# Patient Record
Sex: FEMALE | Race: WHITE | NOT HISPANIC OR LATINO | Employment: OTHER | URBAN - METROPOLITAN AREA
[De-identification: names, ages, dates, MRNs, and addresses within clinical notes are randomized per-mention and may not be internally consistent; named-entity substitution may affect disease eponyms.]

---

## 2023-03-24 LAB
ALANINE AMINOTRANSFERASE (SGPT) (U/L) IN SER/PLAS: 17 U/L (ref 7–45)
ALBUMIN (G/DL) IN SER/PLAS: 4.4 G/DL (ref 3.4–5)
ALKALINE PHOSPHATASE (U/L) IN SER/PLAS: 73 U/L (ref 33–136)
ANION GAP IN SER/PLAS: 15 MMOL/L (ref 10–20)
ASPARTATE AMINOTRANSFERASE (SGOT) (U/L) IN SER/PLAS: 20 U/L (ref 9–39)
BILIRUBIN TOTAL (MG/DL) IN SER/PLAS: 0.7 MG/DL (ref 0–1.2)
CALCIUM (MG/DL) IN SER/PLAS: 10 MG/DL (ref 8.6–10.3)
CARBON DIOXIDE, TOTAL (MMOL/L) IN SER/PLAS: 28 MMOL/L (ref 21–32)
CHLORIDE (MMOL/L) IN SER/PLAS: 103 MMOL/L (ref 98–107)
CHOLESTEROL (MG/DL) IN SER/PLAS: 183 MG/DL (ref 0–199)
CHOLESTEROL IN HDL (MG/DL) IN SER/PLAS: 62.1 MG/DL
CHOLESTEROL/HDL RATIO: 2.9
CREATININE (MG/DL) IN SER/PLAS: 1.01 MG/DL (ref 0.5–1.05)
ERYTHROCYTE DISTRIBUTION WIDTH (RATIO) BY AUTOMATED COUNT: 13.7 % (ref 11.5–14.5)
ERYTHROCYTE MEAN CORPUSCULAR HEMOGLOBIN CONCENTRATION (G/DL) BY AUTOMATED: 31.8 G/DL (ref 32–36)
ERYTHROCYTE MEAN CORPUSCULAR VOLUME (FL) BY AUTOMATED COUNT: 102 FL (ref 80–100)
ERYTHROCYTES (10*6/UL) IN BLOOD BY AUTOMATED COUNT: 4.3 X10E12/L (ref 4–5.2)
ESTIMATED AVERAGE GLUCOSE FOR HBA1C: 111 MG/DL
GFR FEMALE: 55 ML/MIN/1.73M2
GLUCOSE (MG/DL) IN SER/PLAS: 109 MG/DL (ref 74–99)
HEMATOCRIT (%) IN BLOOD BY AUTOMATED COUNT: 44 % (ref 36–46)
HEMOGLOBIN (G/DL) IN BLOOD: 14 G/DL (ref 12–16)
HEMOGLOBIN A1C/HEMOGLOBIN TOTAL IN BLOOD: 5.5 %
LDL: 99 MG/DL (ref 0–99)
LEUKOCYTES (10*3/UL) IN BLOOD BY AUTOMATED COUNT: 5.9 X10E9/L (ref 4.4–11.3)
PLATELETS (10*3/UL) IN BLOOD AUTOMATED COUNT: 185 X10E9/L (ref 150–450)
POTASSIUM (MMOL/L) IN SER/PLAS: 3.7 MMOL/L (ref 3.5–5.3)
PROTEIN TOTAL: 7.7 G/DL (ref 6.4–8.2)
SODIUM (MMOL/L) IN SER/PLAS: 142 MMOL/L (ref 136–145)
THYROTROPIN (MIU/L) IN SER/PLAS BY DETECTION LIMIT <= 0.05 MIU/L: 5.94 MIU/L (ref 0.44–3.98)
THYROXINE (T4) FREE (NG/DL) IN SER/PLAS: 0.73 NG/DL (ref 0.61–1.12)
TRIGLYCERIDE (MG/DL) IN SER/PLAS: 112 MG/DL (ref 0–149)
UREA NITROGEN (MG/DL) IN SER/PLAS: 18 MG/DL (ref 6–23)
VLDL: 22 MG/DL (ref 0–40)

## 2023-06-01 ENCOUNTER — TELEPHONE (OUTPATIENT)
Dept: PRIMARY CARE | Facility: CLINIC | Age: 83
End: 2023-06-01
Payer: MEDICARE

## 2023-06-01 NOTE — TELEPHONE ENCOUNTER
Pt is checking if she is due for a pneumonia injection again. She cannot recall when she last had one. Please advise as she is a Dr Rivera pt.

## 2023-08-06 DIAGNOSIS — I48.92 UNSPECIFIED ATRIAL FLUTTER (MULTI): ICD-10-CM

## 2023-08-06 RX ORDER — AMIODARONE HYDROCHLORIDE 100 MG/1
100 TABLET ORAL DAILY
Qty: 90 TABLET | Refills: 1 | Status: SHIPPED | OUTPATIENT
Start: 2023-08-06 | End: 2024-01-15

## 2023-09-08 ENCOUNTER — TELEPHONE (OUTPATIENT)
Dept: PRIMARY CARE | Facility: CLINIC | Age: 83
End: 2023-09-08
Payer: MEDICARE

## 2023-09-08 DIAGNOSIS — N39.0 URINARY TRACT INFECTION WITHOUT HEMATURIA, SITE UNSPECIFIED: Primary | ICD-10-CM

## 2023-09-08 RX ORDER — NITROFURANTOIN 25; 75 MG/1; MG/1
100 CAPSULE ORAL EVERY 12 HOURS SCHEDULED
COMMUNITY
End: 2023-09-08 | Stop reason: SDUPTHER

## 2023-09-08 RX ORDER — NITROFURANTOIN 25; 75 MG/1; MG/1
100 CAPSULE ORAL EVERY 12 HOURS SCHEDULED
Qty: 14 CAPSULE | Refills: 0 | Status: SHIPPED | OUTPATIENT
Start: 2023-09-08 | End: 2023-09-19

## 2023-09-19 DIAGNOSIS — N39.0 URINARY TRACT INFECTION WITHOUT HEMATURIA, SITE UNSPECIFIED: ICD-10-CM

## 2023-09-19 RX ORDER — NITROFURANTOIN 25; 75 MG/1; MG/1
100 CAPSULE ORAL EVERY 12 HOURS
Qty: 14 CAPSULE | Refills: 0 | Status: SHIPPED | OUTPATIENT
Start: 2023-09-19 | End: 2023-09-26

## 2023-10-30 DIAGNOSIS — E78.5 HYPERLIPIDEMIA, UNSPECIFIED HYPERLIPIDEMIA TYPE: Primary | ICD-10-CM

## 2023-10-30 DIAGNOSIS — I10 ESSENTIAL (PRIMARY) HYPERTENSION: ICD-10-CM

## 2023-10-31 RX ORDER — ATORVASTATIN CALCIUM 40 MG/1
40 TABLET, FILM COATED ORAL DAILY
Qty: 90 TABLET | Refills: 3 | Status: SHIPPED | OUTPATIENT
Start: 2023-10-31 | End: 2024-05-08 | Stop reason: SDUPTHER

## 2023-10-31 RX ORDER — AMLODIPINE BESYLATE 5 MG/1
5 TABLET ORAL DAILY
Qty: 90 TABLET | Refills: 3 | Status: SHIPPED | OUTPATIENT
Start: 2023-10-31

## 2023-11-21 DIAGNOSIS — F41.8 OTHER SPECIFIED ANXIETY DISORDERS: ICD-10-CM

## 2023-11-21 DIAGNOSIS — M79.606 PAIN IN LEG, UNSPECIFIED: ICD-10-CM

## 2023-11-21 RX ORDER — MELOXICAM 7.5 MG/1
TABLET ORAL
Qty: 90 TABLET | Refills: 2 | Status: SHIPPED | OUTPATIENT
Start: 2023-11-21

## 2023-11-21 RX ORDER — PAROXETINE HYDROCHLORIDE 20 MG/1
20 TABLET, FILM COATED ORAL DAILY
Qty: 90 TABLET | Refills: 3 | Status: SHIPPED | OUTPATIENT
Start: 2023-11-21

## 2023-12-14 PROBLEM — N18.31 CKD STAGE G3A/A1, GFR 45-59 AND ALBUMIN CREATININE RATIO <30 MG/G (MULTI): Status: ACTIVE | Noted: 2023-12-14

## 2023-12-14 PROBLEM — M85.80 OSTEOPENIA: Status: ACTIVE | Noted: 2023-12-14

## 2023-12-14 PROBLEM — I48.92 ATRIAL FLUTTER (MULTI): Status: ACTIVE | Noted: 2023-12-14

## 2023-12-14 PROBLEM — I77.9 BILATERAL CAROTID ARTERY DISEASE (CMS-HCC): Status: ACTIVE | Noted: 2023-12-14

## 2023-12-14 PROBLEM — F33.1 MAJOR DEPRESSIVE DISORDER, RECURRENT, MODERATE (MULTI): Status: ACTIVE | Noted: 2023-12-14

## 2023-12-14 PROBLEM — R59.0 MEDIASTINAL LYMPHADENOPATHY: Status: ACTIVE | Noted: 2023-12-14

## 2023-12-14 PROBLEM — M19.90 OSTEOARTHRITIS: Status: ACTIVE | Noted: 2023-12-14

## 2023-12-14 PROBLEM — E78.5 HYPERLIPIDEMIA: Status: ACTIVE | Noted: 2023-12-14

## 2023-12-14 PROBLEM — J43.9 PULMONARY EMPHYSEMA (MULTI): Status: ACTIVE | Noted: 2023-12-14

## 2023-12-14 PROBLEM — I65.23 BILATERAL CAROTID ARTERY STENOSIS: Status: ACTIVE | Noted: 2023-12-14

## 2023-12-14 PROBLEM — I25.10 CAD (CORONARY ARTERY DISEASE): Status: ACTIVE | Noted: 2023-12-14

## 2023-12-14 PROBLEM — I48.0 PAROXYSMAL ATRIAL FIBRILLATION (MULTI): Status: ACTIVE | Noted: 2023-12-14

## 2023-12-14 PROBLEM — F41.8 DEPRESSION WITH ANXIETY: Status: ACTIVE | Noted: 2023-12-14

## 2023-12-14 PROBLEM — R09.02 HYPOXIA: Status: ACTIVE | Noted: 2023-12-14

## 2023-12-14 PROBLEM — I10 BENIGN HYPERTENSION: Status: ACTIVE | Noted: 2023-12-14

## 2023-12-14 PROBLEM — D64.9 ANEMIA: Status: ACTIVE | Noted: 2023-12-14

## 2023-12-14 PROBLEM — K74.60 LIVER CIRRHOSIS (MULTI): Status: ACTIVE | Noted: 2023-12-14

## 2024-01-02 ENCOUNTER — OFFICE VISIT (OUTPATIENT)
Dept: PRIMARY CARE | Facility: CLINIC | Age: 84
End: 2024-01-02
Payer: MEDICARE

## 2024-01-02 VITALS
TEMPERATURE: 97 F | OXYGEN SATURATION: 98 % | SYSTOLIC BLOOD PRESSURE: 126 MMHG | HEART RATE: 55 BPM | DIASTOLIC BLOOD PRESSURE: 66 MMHG | HEIGHT: 65 IN | BODY MASS INDEX: 26.19 KG/M2 | WEIGHT: 157.2 LBS | RESPIRATION RATE: 14 BRPM

## 2024-01-02 DIAGNOSIS — Z23 NEED FOR VACCINATION: ICD-10-CM

## 2024-01-02 DIAGNOSIS — K74.60 CIRRHOSIS OF LIVER WITHOUT ASCITES, UNSPECIFIED HEPATIC CIRRHOSIS TYPE (MULTI): ICD-10-CM

## 2024-01-02 DIAGNOSIS — Z78.0 ASYMPTOMATIC MENOPAUSAL STATE: ICD-10-CM

## 2024-01-02 DIAGNOSIS — F33.1 MAJOR DEPRESSIVE DISORDER, RECURRENT, MODERATE (MULTI): ICD-10-CM

## 2024-01-02 DIAGNOSIS — M85.89 OSTEOPENIA OF MULTIPLE SITES: ICD-10-CM

## 2024-01-02 DIAGNOSIS — Z12.31 ENCOUNTER FOR SCREENING MAMMOGRAM FOR BREAST CANCER: ICD-10-CM

## 2024-01-02 DIAGNOSIS — I48.0 PAROXYSMAL ATRIAL FIBRILLATION (MULTI): ICD-10-CM

## 2024-01-02 DIAGNOSIS — J43.9 PULMONARY EMPHYSEMA, UNSPECIFIED EMPHYSEMA TYPE (MULTI): ICD-10-CM

## 2024-01-02 DIAGNOSIS — G83.10 PARESIS OF LOWER EXTREMITY (MULTI): ICD-10-CM

## 2024-01-02 DIAGNOSIS — I77.9 BILATERAL CAROTID ARTERY DISEASE, UNSPECIFIED TYPE (CMS-HCC): ICD-10-CM

## 2024-01-02 DIAGNOSIS — Z00.00 ROUTINE GENERAL MEDICAL EXAMINATION AT HEALTH CARE FACILITY: Primary | ICD-10-CM

## 2024-01-02 DIAGNOSIS — R73.9 HYPERGLYCEMIA: ICD-10-CM

## 2024-01-02 DIAGNOSIS — E78.2 MIXED HYPERLIPIDEMIA: ICD-10-CM

## 2024-01-02 DIAGNOSIS — N18.31 CKD STAGE G3A/A1, GFR 45-59 AND ALBUMIN CREATININE RATIO <30 MG/G (MULTI): ICD-10-CM

## 2024-01-02 PROBLEM — J18.1 LUNG CONSOLIDATION (CMS-HCC): Status: ACTIVE | Noted: 2024-01-02

## 2024-01-02 PROBLEM — M79.639 PAIN IN FOREARM: Status: ACTIVE | Noted: 2024-01-02

## 2024-01-02 PROBLEM — R22.42 LOCALIZED SWELLING OF LEFT LOWER LEG: Status: ACTIVE | Noted: 2024-01-02

## 2024-01-02 PROBLEM — R07.81 RIB PAIN: Status: ACTIVE | Noted: 2024-01-02

## 2024-01-02 PROBLEM — R00.0 SINUS TACHYCARDIA: Status: ACTIVE | Noted: 2024-01-02

## 2024-01-02 PROBLEM — R09.89 CAROTID BRUIT: Status: ACTIVE | Noted: 2024-01-02

## 2024-01-02 PROBLEM — R53.83 FATIGUE: Status: ACTIVE | Noted: 2024-01-02

## 2024-01-02 PROBLEM — R91.8 GROUND GLASS OPACITY PRESENT ON IMAGING OF LUNG: Status: ACTIVE | Noted: 2023-03-21

## 2024-01-02 PROBLEM — J92.9 PLEURAL THICKENING: Status: ACTIVE | Noted: 2024-01-02

## 2024-01-02 PROBLEM — R91.1 SOLITARY PULMONARY NODULE: Status: ACTIVE | Noted: 2023-03-21

## 2024-01-02 PROBLEM — R91.1 LUNG NODULE: Status: ACTIVE | Noted: 2024-01-02

## 2024-01-02 PROBLEM — N39.0 URINARY TRACT INFECTION: Status: ACTIVE | Noted: 2024-01-02

## 2024-01-02 PROBLEM — G56.30 RADIAL TUNNEL SYNDROME: Status: ACTIVE | Noted: 2024-01-02

## 2024-01-02 PROBLEM — R91.8 ABNORMAL FINDINGS ON DIAGNOSTIC IMAGING OF LUNG: Status: ACTIVE | Noted: 2024-01-02

## 2024-01-02 PROBLEM — D75.89 MACROCYTOSIS WITHOUT ANEMIA: Status: ACTIVE | Noted: 2024-01-02

## 2024-01-02 PROBLEM — A41.9 SEPSIS (MULTI): Status: ACTIVE | Noted: 2024-01-02

## 2024-01-02 PROBLEM — R39.9 SYMPTOMS INVOLVING URINARY SYSTEM: Status: ACTIVE | Noted: 2024-01-02

## 2024-01-02 PROBLEM — R00.2 PALPITATIONS: Status: ACTIVE | Noted: 2024-01-02

## 2024-01-02 PROBLEM — M25.569 KNEE PAIN: Status: ACTIVE | Noted: 2024-01-02

## 2024-01-02 PROBLEM — M79.89 SWELLING OF RIGHT LOWER EXTREMITY: Status: ACTIVE | Noted: 2024-01-02

## 2024-01-02 PROBLEM — K40.90 RIGHT INGUINAL HERNIA: Status: ACTIVE | Noted: 2024-01-02

## 2024-01-02 PROBLEM — M79.606 PAIN OF LOWER EXTREMITY: Status: ACTIVE | Noted: 2024-01-02

## 2024-01-02 PROBLEM — S20.219A CONTUSION OF RIB: Status: ACTIVE | Noted: 2024-01-02

## 2024-01-02 PROBLEM — R10.30 INGUINAL PAIN: Status: ACTIVE | Noted: 2024-01-02

## 2024-01-02 PROBLEM — J06.9 ACUTE UPPER RESPIRATORY INFECTION: Status: ACTIVE | Noted: 2024-01-02

## 2024-01-02 PROBLEM — M25.559 ARTHRALGIA OF HIP: Status: ACTIVE | Noted: 2024-01-02

## 2024-01-02 PROCEDURE — 1160F RVW MEDS BY RX/DR IN RCRD: CPT | Performed by: INTERNAL MEDICINE

## 2024-01-02 PROCEDURE — 1036F TOBACCO NON-USER: CPT | Performed by: INTERNAL MEDICINE

## 2024-01-02 PROCEDURE — 99214 OFFICE O/P EST MOD 30 MIN: CPT | Performed by: INTERNAL MEDICINE

## 2024-01-02 PROCEDURE — 1126F AMNT PAIN NOTED NONE PRSNT: CPT | Performed by: INTERNAL MEDICINE

## 2024-01-02 PROCEDURE — G0009 ADMIN PNEUMOCOCCAL VACCINE: HCPCS | Performed by: INTERNAL MEDICINE

## 2024-01-02 PROCEDURE — 99397 PER PM REEVAL EST PAT 65+ YR: CPT | Performed by: INTERNAL MEDICINE

## 2024-01-02 PROCEDURE — 3078F DIAST BP <80 MM HG: CPT | Performed by: INTERNAL MEDICINE

## 2024-01-02 PROCEDURE — 1159F MED LIST DOCD IN RCRD: CPT | Performed by: INTERNAL MEDICINE

## 2024-01-02 PROCEDURE — 90677 PCV20 VACCINE IM: CPT | Performed by: INTERNAL MEDICINE

## 2024-01-02 PROCEDURE — 1170F FXNL STATUS ASSESSED: CPT | Performed by: INTERNAL MEDICINE

## 2024-01-02 PROCEDURE — 3074F SYST BP LT 130 MM HG: CPT | Performed by: INTERNAL MEDICINE

## 2024-01-02 PROCEDURE — G0439 PPPS, SUBSEQ VISIT: HCPCS | Performed by: INTERNAL MEDICINE

## 2024-01-02 RX ORDER — CARVEDILOL 6.25 MG/1
1 TABLET ORAL
COMMUNITY

## 2024-01-02 ASSESSMENT — ENCOUNTER SYMPTOMS
DIZZINESS: 0
EYES NEGATIVE: 1
BACK PAIN: 0
APPETITE CHANGE: 0
MYALGIAS: 0
WHEEZING: 0
SINUS PAIN: 0
TROUBLE SWALLOWING: 0
DYSURIA: 0
SLEEP DISTURBANCE: 0
CONFUSION: 0
GASTROINTESTINAL NEGATIVE: 1
HEADACHES: 0
SINUS PRESSURE: 0
COUGH: 0
VOMITING: 0
CONSTITUTIONAL NEGATIVE: 1
FLANK PAIN: 0
LIGHT-HEADEDNESS: 0
MUSCULOSKELETAL NEGATIVE: 1
COLOR CHANGE: 0
POLYDIPSIA: 0
NECK STIFFNESS: 0
POLYPHAGIA: 0
BRUISES/BLEEDS EASILY: 0
EYE PAIN: 0
DIFFICULTY URINATING: 0
NUMBNESS: 0
SHORTNESS OF BREATH: 0
PALPITATIONS: 0
ADENOPATHY: 0
DEPRESSION: 0
LOSS OF SENSATION IN FEET: 0
PSYCHIATRIC NEGATIVE: 1
UNEXPECTED WEIGHT CHANGE: 0
ENDOCRINE NEGATIVE: 1
EYE DISCHARGE: 0
EYE REDNESS: 0
NERVOUS/ANXIOUS: 0
NECK PAIN: 0
NEUROLOGICAL NEGATIVE: 1
SPEECH DIFFICULTY: 0
HALLUCINATIONS: 0
SORE THROAT: 0
BLOOD IN STOOL: 0
SEIZURES: 0
HEMATOLOGIC/LYMPHATIC NEGATIVE: 1
NAUSEA: 0
ACTIVITY CHANGE: 0
STRIDOR: 0
JOINT SWELLING: 0
WEAKNESS: 0
AGITATION: 0
ALLERGIC/IMMUNOLOGIC NEGATIVE: 1
ARTHRALGIAS: 0
VOICE CHANGE: 0
RESPIRATORY NEGATIVE: 1
DIARRHEA: 0
FREQUENCY: 0
CHEST TIGHTNESS: 0
FACIAL ASYMMETRY: 0
ABDOMINAL PAIN: 0
WOUND: 0
TREMORS: 0
CONSTIPATION: 0
OCCASIONAL FEELINGS OF UNSTEADINESS: 0
CARDIOVASCULAR NEGATIVE: 1

## 2024-01-02 ASSESSMENT — PATIENT HEALTH QUESTIONNAIRE - PHQ9
1. LITTLE INTEREST OR PLEASURE IN DOING THINGS: NOT AT ALL
2. FEELING DOWN, DEPRESSED OR HOPELESS: NOT AT ALL
SUM OF ALL RESPONSES TO PHQ9 QUESTIONS 1 AND 2: 0

## 2024-01-02 ASSESSMENT — ACTIVITIES OF DAILY LIVING (ADL)
BATHING: INDEPENDENT
DOING_HOUSEWORK: INDEPENDENT
MANAGING_FINANCES: INDEPENDENT
GROCERY_SHOPPING: INDEPENDENT
DRESSING: INDEPENDENT
TAKING_MEDICATION: INDEPENDENT

## 2024-01-02 NOTE — PATIENT INSTRUCTIONS

## 2024-01-02 NOTE — PROGRESS NOTES
Subjective   Reason for Visit: Karen Patel is an 83 y.o. female here for a Medicare Wellness visit.     Past Medical, Surgical, and Family History reviewed and updated in chart.    Reviewed all medications by prescribing practitioner or clinical pharmacist (such as prescriptions, OTCs, herbal therapies and supplements) and documented in the medical record.    [facility]    Patient Care Team:  Giovanna Rowe MD PhD as PCP - General  Giovanna Rowe MD PhD as PCP - [employer] PCP     Patient comes for MW, CPE and f/up visit.   Patient has been feeling pretty good and has been complaint with prescribed medications.    We reviewed and discussed details of recent blood work: CBC, CMP, TSH, Lipid panel, Hb A1c, Vit D done in 3/2023. Results within acceptable range.    Last mammogram: 1/31/23  DEXA: 2022  PAP: n/a  Colonoscopy: n/a  Pneumonia vacc: today    Adv. Dir:  not on file    Her CT abdomen done in September 2021 showed irregular shape of the liver suspicious for cirrhosis. She saw GI  had Fiber scan done which showed diffuse steatosis.   Additionally 10 mm left lower lung nodule noted.   She saw pulmonology Dr. Tyson, had f/u CT and PET scan done. Advised closed monitoring.   Follows with  GI and pulmonary specialists.      Patient has been following with vascular surgeon regarding carotid stenosis.      Her DEXA scan in June 2021 showed osteopenia.      Due to concern about left leg pain and some swelling behind left knee she had venous duplex done in June 2020 which was negative.  Subsequently saw ortho Dr. Whyte , had X-ray , diagnosed with mild arthritis. Her symptoms improved/resolved.      Her brother was diagnosed with advanced bladder ca, previously had lung ca and recovered from it.    Patient follows with  cardiologist, previously diagnosed with atrial flutter, advised Amiodarone and Xarelto 15 mg daily.  I reviewed recent cardiology note (Dr. Simpson: Sept 2023).    Carotid duplex  showed bilateral carotid disease 50-69% stenosis with heterogenous plague.  Follows with vascular surgeon.     Currently on Xarelto as recommended by cardiologist. Denies any excessive bleeding or bruising.      Recent carotid duplex in 2022 showed moderate carotid stenosis, she will f/u with vascular surgeon.  Review of Systems   Constitutional: Negative.  Negative for activity change, appetite change and unexpected weight change.   HENT: Negative.  Negative for congestion, ear discharge, ear pain, hearing loss, mouth sores, nosebleeds, sinus pressure, sinus pain, sore throat, trouble swallowing and voice change.    Eyes: Negative.  Negative for pain, discharge, redness and visual disturbance.   Respiratory: Negative.  Negative for cough, chest tightness, shortness of breath, wheezing and stridor.    Cardiovascular: Negative.  Negative for chest pain, palpitations and leg swelling.   Gastrointestinal: Negative.  Negative for abdominal pain, blood in stool, constipation, diarrhea, nausea and vomiting.   Endocrine: Negative.  Negative for polydipsia, polyphagia and polyuria.   Genitourinary: Negative.  Negative for difficulty urinating, dysuria, flank pain, frequency and urgency.   Musculoskeletal: Negative.  Negative for arthralgias, back pain, gait problem, joint swelling, myalgias, neck pain and neck stiffness.   Skin: Negative.  Negative for color change, rash and wound.   Allergic/Immunologic: Negative.  Negative for environmental allergies, food allergies and immunocompromised state.   Neurological: Negative.  Negative for dizziness, tremors, seizures, syncope, facial asymmetry, speech difficulty, weakness, light-headedness, numbness and headaches.   Hematological: Negative.  Negative for adenopathy. Does not bruise/bleed easily.   Psychiatric/Behavioral: Negative.  Negative for agitation, behavioral problems, confusion, hallucinations, sleep disturbance and suicidal ideas. The patient is not nervous/anxious.   "  All other systems reviewed and are negative.      Objective   Vitals:  /66   Pulse 55   Temp 36.1 °C (97 °F)   Resp 14   Ht 1.651 m (5' 5\")   Wt 71.3 kg (157 lb 3.2 oz)   SpO2 98%   BMI 26.16 kg/m²       Physical Exam  Vitals and nursing note reviewed.   Constitutional:       General: She is not in acute distress.     Appearance: Normal appearance.   HENT:      Head: Normocephalic and atraumatic.      Right Ear: Tympanic membrane, ear canal and external ear normal.      Left Ear: Tympanic membrane, ear canal and external ear normal.      Nose: Nose normal. No congestion or rhinorrhea.      Mouth/Throat:      Mouth: Mucous membranes are moist.      Pharynx: Oropharynx is clear.   Eyes:      General:         Right eye: No discharge.         Left eye: No discharge.      Extraocular Movements: Extraocular movements intact.      Conjunctiva/sclera: Conjunctivae normal.      Pupils: Pupils are equal, round, and reactive to light.   Cardiovascular:      Rate and Rhythm: Normal rate and regular rhythm.      Pulses: Normal pulses.      Heart sounds: Normal heart sounds. No murmur heard.     No friction rub. No gallop.   Pulmonary:      Effort: Pulmonary effort is normal. No respiratory distress.      Breath sounds: Normal breath sounds. No stridor. No wheezing, rhonchi or rales.   Chest:      Chest wall: No tenderness.   Abdominal:      General: Bowel sounds are normal.      Palpations: Abdomen is soft. There is no mass.      Tenderness: There is no abdominal tenderness. There is no guarding or rebound.   Musculoskeletal:         General: No swelling or deformity. Normal range of motion.      Cervical back: Normal range of motion and neck supple. No rigidity or tenderness.      Right lower leg: No edema.      Left lower leg: No edema.   Lymphadenopathy:      Cervical: No cervical adenopathy.   Skin:     General: Skin is warm and dry.      Coloration: Skin is not jaundiced.      Findings: No bruising or " erythema.   Neurological:      General: No focal deficit present.      Mental Status: She is alert and oriented to person, place, and time. Mental status is at baseline.      Cranial Nerves: No cranial nerve deficit.      Motor: No weakness.      Coordination: Coordination normal.      Gait: Gait normal.   Psychiatric:         Mood and Affect: Mood normal.         Behavior: Behavior normal.         Thought Content: Thought content normal.         Judgment: Judgment normal.         Assessment/Plan   Problem List Items Addressed This Visit          Cardiac and Vasculature    Bilateral carotid artery disease (CMS/HCC)    Current Assessment & Plan     Clinically stable. F/up with cardiology.          Hyperlipidemia    Current Assessment & Plan     Continue statin.         Relevant Orders    Comprehensive Metabolic Panel    Lipid Panel    Paroxysmal atrial fibrillation (CMS/HCC)    Current Assessment & Plan     Clinically stable. F/up with cardiology.          Relevant Medications    carvedilol (Coreg) 6.25 mg tablet    Other Relevant Orders    CBC    TSH with reflex to Free T4 if abnormal       Endocrine/Metabolic    Hyperglycemia    Current Assessment & Plan     Low cholesterol and low carbohydrate diet is advised.          Relevant Orders    Hemoglobin A1C       Gastrointestinal and Abdominal    Liver cirrhosis (CMS/HCC)    Current Assessment & Plan     Clinically stable. Will monitor.             Genitourinary and Reproductive    CKD stage G3a/A1, GFR 45-59 and albumin creatinine ratio <30 mg/g (CMS/HCC)    Current Assessment & Plan     Clinically stable. Will monitor.            Health Encounters    Routine general medical examination at health care facility - Primary       Mental Health    Major depressive disorder, recurrent, moderate (CMS/HCC)    Current Assessment & Plan     Clinically stable. Continue current treatment.             Musculoskeletal and Injuries    Osteopenia    Relevant Orders    Vitamin D  25-Hydroxy,Total (for eval of Vitamin D levels)       Neuro    Paresis of lower extremity (CMS/HCC)    Current Assessment & Plan     Clinically stable. Continue exercise routine.             Pulmonary and Pneumonias    Pulmonary emphysema (CMS/HCC)    Current Assessment & Plan     Clinically stable. Will monitor.            Other Visit Diagnoses       Need for vaccination        Relevant Orders    Pneumococcal conjugate vaccine 20-valent IM (Completed)    Asymptomatic menopausal state        Relevant Orders    XR DEXA bone density    Encounter for screening mammogram for breast cancer        Relevant Orders    BI mammo bilateral screening tomosynthesis        It was a pleasure to see you today.  I would like to remind you about importance of a healthy lifestyle in order to improve your well-being and live longer.  Try to engage in physical activities for at least 150 minutes per week.  Eat about 10 servings of fruits and vegetables daily. My advice is 2 servings of fruits and 8 servings of vegetables.  For vegetables choose at least half of them green and at least half of them fresh.  Please avoid sugar, salt, fried food and saturated fat.    I spent a total of 30 minutes on the date of service for follow up visit, which included preparing to see the patient, face-to-face patient care, completing clinical documentation, obtaining and/or reviewing separately obtained history, performing a medically appropriate examination, counseling and educating the patient/family/caregiver, ordering medications, tests, or procedures, communicating with other health care providers (not separately reported), independently interpreting results (not separately reported), communicating results to the patient/family/caregiver, and care coordination (not separately reported).    Please bring copy of your Healthcare Living Will and POA for next visit as discussed.    F/up in 6 months or sooner if needed.     Patient was identified as a  fall risk. Risk prevention instructions provided.

## 2024-01-08 ENCOUNTER — APPOINTMENT (OUTPATIENT)
Dept: PRIMARY CARE | Facility: CLINIC | Age: 84
End: 2024-01-08
Payer: MEDICARE

## 2024-01-11 ENCOUNTER — APPOINTMENT (OUTPATIENT)
Dept: CARDIOLOGY | Facility: CLINIC | Age: 84
End: 2024-01-11
Payer: MEDICARE

## 2024-01-15 DIAGNOSIS — I48.92 UNSPECIFIED ATRIAL FLUTTER (MULTI): ICD-10-CM

## 2024-01-15 RX ORDER — AMIODARONE HYDROCHLORIDE 100 MG/1
100 TABLET ORAL DAILY
Qty: 90 TABLET | Refills: 1 | Status: SHIPPED | OUTPATIENT
Start: 2024-01-15

## 2024-02-01 ENCOUNTER — LAB (OUTPATIENT)
Dept: LAB | Facility: LAB | Age: 84
End: 2024-02-01
Payer: MEDICARE

## 2024-02-01 DIAGNOSIS — I48.0 PAROXYSMAL ATRIAL FIBRILLATION (MULTI): ICD-10-CM

## 2024-02-01 DIAGNOSIS — M85.89 OSTEOPENIA OF MULTIPLE SITES: ICD-10-CM

## 2024-02-01 DIAGNOSIS — R73.9 HYPERGLYCEMIA: ICD-10-CM

## 2024-02-01 DIAGNOSIS — E78.2 MIXED HYPERLIPIDEMIA: ICD-10-CM

## 2024-02-01 LAB
25(OH)D3 SERPL-MCNC: 43 NG/ML (ref 30–100)
ALBUMIN SERPL BCP-MCNC: 4.2 G/DL (ref 3.4–5)
ALP SERPL-CCNC: 58 U/L (ref 33–136)
ALT SERPL W P-5'-P-CCNC: 13 U/L (ref 7–45)
ANION GAP SERPL CALC-SCNC: 12 MMOL/L (ref 10–20)
AST SERPL W P-5'-P-CCNC: 15 U/L (ref 9–39)
BILIRUB SERPL-MCNC: 0.7 MG/DL (ref 0–1.2)
BUN SERPL-MCNC: 20 MG/DL (ref 6–23)
CALCIUM SERPL-MCNC: 9.4 MG/DL (ref 8.6–10.3)
CHLORIDE SERPL-SCNC: 105 MMOL/L (ref 98–107)
CHOLEST SERPL-MCNC: 157 MG/DL (ref 0–199)
CHOLESTEROL/HDL RATIO: 2.8
CO2 SERPL-SCNC: 29 MMOL/L (ref 21–32)
CREAT SERPL-MCNC: 1.06 MG/DL (ref 0.5–1.05)
EGFRCR SERPLBLD CKD-EPI 2021: 52 ML/MIN/1.73M*2
ERYTHROCYTE [DISTWIDTH] IN BLOOD BY AUTOMATED COUNT: 13.7 % (ref 11.5–14.5)
EST. AVERAGE GLUCOSE BLD GHB EST-MCNC: 108 MG/DL
GLUCOSE SERPL-MCNC: 93 MG/DL (ref 74–99)
HBA1C MFR BLD: 5.4 %
HCT VFR BLD AUTO: 37.7 % (ref 36–46)
HDLC SERPL-MCNC: 56.7 MG/DL
HGB BLD-MCNC: 12.1 G/DL (ref 12–16)
LDLC SERPL CALC-MCNC: 80 MG/DL
MCH RBC QN AUTO: 33 PG (ref 26–34)
MCHC RBC AUTO-ENTMCNC: 32.1 G/DL (ref 32–36)
MCV RBC AUTO: 103 FL (ref 80–100)
NON HDL CHOLESTEROL: 100 MG/DL (ref 0–149)
NRBC BLD-RTO: 0 /100 WBCS (ref 0–0)
PLATELET # BLD AUTO: 168 X10*3/UL (ref 150–450)
POTASSIUM SERPL-SCNC: 4.3 MMOL/L (ref 3.5–5.3)
PROT SERPL-MCNC: 6.9 G/DL (ref 6.4–8.2)
RBC # BLD AUTO: 3.67 X10*6/UL (ref 4–5.2)
SODIUM SERPL-SCNC: 142 MMOL/L (ref 136–145)
T4 FREE SERPL-MCNC: 0.79 NG/DL (ref 0.61–1.12)
TRIGL SERPL-MCNC: 100 MG/DL (ref 0–149)
TSH SERPL-ACNC: 5.68 MIU/L (ref 0.44–3.98)
VLDL: 20 MG/DL (ref 0–40)
WBC # BLD AUTO: 4.8 X10*3/UL (ref 4.4–11.3)

## 2024-02-01 PROCEDURE — 83036 HEMOGLOBIN GLYCOSYLATED A1C: CPT

## 2024-02-01 PROCEDURE — 36415 COLL VENOUS BLD VENIPUNCTURE: CPT

## 2024-02-01 PROCEDURE — 84443 ASSAY THYROID STIM HORMONE: CPT

## 2024-02-01 PROCEDURE — 80053 COMPREHEN METABOLIC PANEL: CPT

## 2024-02-01 PROCEDURE — 80061 LIPID PANEL: CPT

## 2024-02-01 PROCEDURE — 84439 ASSAY OF FREE THYROXINE: CPT

## 2024-02-01 PROCEDURE — 82306 VITAMIN D 25 HYDROXY: CPT

## 2024-02-01 PROCEDURE — 85027 COMPLETE CBC AUTOMATED: CPT

## 2024-04-11 ENCOUNTER — HOSPITAL ENCOUNTER (OUTPATIENT)
Dept: RADIOLOGY | Facility: CLINIC | Age: 84
Discharge: HOME | End: 2024-04-11
Payer: MEDICARE

## 2024-04-11 VITALS — HEIGHT: 65 IN | WEIGHT: 157.19 LBS | BODY MASS INDEX: 26.19 KG/M2

## 2024-04-11 DIAGNOSIS — Z12.31 ENCOUNTER FOR SCREENING MAMMOGRAM FOR BREAST CANCER: ICD-10-CM

## 2024-04-11 PROCEDURE — 77067 SCR MAMMO BI INCL CAD: CPT

## 2024-04-11 PROCEDURE — 77063 BREAST TOMOSYNTHESIS BI: CPT | Performed by: RADIOLOGY

## 2024-04-11 PROCEDURE — 77067 SCR MAMMO BI INCL CAD: CPT | Performed by: RADIOLOGY

## 2024-05-08 ENCOUNTER — OFFICE VISIT (OUTPATIENT)
Dept: CARDIOLOGY | Facility: CLINIC | Age: 84
End: 2024-05-08
Payer: MEDICARE

## 2024-05-08 VITALS
WEIGHT: 156.6 LBS | BODY MASS INDEX: 25.17 KG/M2 | SYSTOLIC BLOOD PRESSURE: 120 MMHG | OXYGEN SATURATION: 95 % | HEART RATE: 61 BPM | DIASTOLIC BLOOD PRESSURE: 56 MMHG | HEIGHT: 66 IN

## 2024-05-08 DIAGNOSIS — R01.1 MURMUR, HEART: ICD-10-CM

## 2024-05-08 DIAGNOSIS — E78.5 HYPERLIPIDEMIA, UNSPECIFIED HYPERLIPIDEMIA TYPE: ICD-10-CM

## 2024-05-08 DIAGNOSIS — I48.92 ATRIAL FLUTTER, UNSPECIFIED TYPE (MULTI): Primary | ICD-10-CM

## 2024-05-08 DIAGNOSIS — R09.89 BRUIT OF RIGHT CAROTID ARTERY: ICD-10-CM

## 2024-05-08 DIAGNOSIS — I10 BENIGN HYPERTENSION: ICD-10-CM

## 2024-05-08 DIAGNOSIS — I25.10 CORONARY ARTERY DISEASE INVOLVING NATIVE CORONARY ARTERY OF NATIVE HEART WITHOUT ANGINA PECTORIS: ICD-10-CM

## 2024-05-08 DIAGNOSIS — I34.0 MITRAL VALVE INSUFFICIENCY, UNSPECIFIED ETIOLOGY: ICD-10-CM

## 2024-05-08 PROCEDURE — 99214 OFFICE O/P EST MOD 30 MIN: CPT | Performed by: NURSE PRACTITIONER

## 2024-05-08 PROCEDURE — 3078F DIAST BP <80 MM HG: CPT | Performed by: NURSE PRACTITIONER

## 2024-05-08 PROCEDURE — 1157F ADVNC CARE PLAN IN RCRD: CPT | Performed by: NURSE PRACTITIONER

## 2024-05-08 PROCEDURE — 1036F TOBACCO NON-USER: CPT | Performed by: NURSE PRACTITIONER

## 2024-05-08 PROCEDURE — 1159F MED LIST DOCD IN RCRD: CPT | Performed by: NURSE PRACTITIONER

## 2024-05-08 PROCEDURE — 1160F RVW MEDS BY RX/DR IN RCRD: CPT | Performed by: NURSE PRACTITIONER

## 2024-05-08 PROCEDURE — 3074F SYST BP LT 130 MM HG: CPT | Performed by: NURSE PRACTITIONER

## 2024-05-08 RX ORDER — ATORVASTATIN CALCIUM 40 MG/1
80 TABLET, FILM COATED ORAL DAILY
Start: 2024-05-08

## 2024-05-08 NOTE — PATIENT INSTRUCTIONS
- recommend [facility] to monitor your atrial fibrillation  - referring you to Dr. Earl, Electrophysiology, to discuss if Amiodarone is best long term option for you.  - increase your Atorvastatin to 80mg once a day (2 tablets)  - Blood work in 2 weeks    Follow up with Dr. Simpson in 6 months    It was my pleasure to meet you. I look forward to being your cardiac Nurse Practitioner. I am a huge believer in communicating with my patients. Please contact me at any time, if anything is not clear to you regarding anything we have discussed, or if new questions occur to you.

## 2024-05-08 NOTE — PROGRESS NOTES
Name : Karen Patel   : 1940   MRN : [id number]   ENC Date : 2024    Primary Cardiologist: Dr. Simpson     CC: 6 month follow up     HPI:    Karen Patel is a 83 y.o. female with PMHx sig for CAD s/p PCI of RCA (17), dyslipidemia, bilateral carotid artery disease, and paroxysmal atrial fibrillation who presents today as above.    Used to be a walker. When covid started she quit. If she walks so far the top of her legs hurt like a tooth ache. Tylenol helps.    Palpitations have been ok. Her Apple Watch has told her that she was in afib 2x in the past 6 month, but she's completely asymptomatic when it happens    [facility]:  Echocardiogram 2022: EF 60 to 65%. Grade 1 diastolic dysfunction. Moderate left atrial enlargement. Moderate MR. Mild AR.     Carotid duplex 2022 with less than 50% stenosis bilaterally.     14 day ZioPatch 20 demonstrating 59% atrial fibrillation/flutter burden with average heart rate 86 bpm and range 53â€“166 bpm. Otherwise sinus rhythm. 10 runs of nonsustained ventricular tachycardia ranging between 4 and 9 beats. 16 runs of supraventricular tachycardia lasting less than 10 beats.     Carotid duplex 2020 demonstrating 50-69% stenosis of the right proximal ICA. 50-69% stenosis of the left proximal ICA.     Catheterization 17 performed for abnormal stress test demonstrating severe RCA stenosis. Underwent PCI of the RCA with overlapping 2.5 x 26 mm and 2.75 x 18 mm drug-eluting stents in the mid vessel. 2.75 x 8 mm Resolute MARILIN in the ostium in a nonoverlapping fashion. LAD 30% proximal stenosis. Ramus intermedius branch angiographically normal. Nondominant circumflex angiographically normal. LVEDP 5 mmHg. EF 60%. No AS and no MR.      Echocardiogram 3/3/16 demonstrating normal LV size and function EF 60-65%. Mild RV enlargement with normal function. Mild right atrial enlargement. Mild aortic stenosis. Mild to moderate MR. Mild TR and MO.     ROS:  unless otherwise noted in the history of present illness, all other systems were reviewed and they are negative for complaints     Allergies:  Sulfa (sulfonamide antibiotics)    Current Outpatient Medications   Medication Instructions    amiodarone (PACERONE) 100 mg, oral, Daily    amLODIPine (NORVASC) 5 mg, oral, Daily    atorvastatin (LIPITOR) 80 mg, oral, Daily    carvedilol (Coreg) 6.25 mg tablet 1 tablet, oral, 2 times daily with meals    meloxicam (Mobic) 7.5 mg tablet TAKE 1 TABLET DAILY AS NEEDED FOR ARTHRITIC PAIN    PARoxetine (PAXIL) 20 mg, oral, Daily    rivaroxaban (XARELTO) 15 mg, oral, 2 times daily with meals, Take with food.        Last Labs:  CBC  Lab Results   Component Value Date    WBC 4.8 02/01/2024    HGB 12.1 02/01/2024    HCT 37.7 02/01/2024     (H) 02/01/2024     02/01/2024       CMP  Lab Results   Component Value Date    CALCIUM 9.4 02/01/2024    PROT 6.9 02/01/2024    ALBUMIN 4.2 02/01/2024    AST 15 02/01/2024    ALT 13 02/01/2024    ALKPHOS 58 02/01/2024    BILITOT 0.7 02/01/2024       BMP   Lab Results   Component Value Date     02/01/2024    K 4.3 02/01/2024     02/01/2024    CO2 29 02/01/2024    GLUCOSE 93 02/01/2024    BUN 20 02/01/2024    CREATININE 1.06 (H) 02/01/2024       LIPID PANEL   Lab Results   Component Value Date    CHOL 157 02/01/2024    TRIG 100 02/01/2024    HDL 56.7 02/01/2024    CHHDL 2.8 02/01/2024    LDLF 99 03/24/2023    VLDL 20 02/01/2024    NHDL 100 02/01/2024       RENAL FUNCTION PANEL   Lab Results   Component Value Date    GLUCOSE 93 02/01/2024     02/01/2024    K 4.3 02/01/2024     02/01/2024    CO2 29 02/01/2024    ANIONGAP 12 02/01/2024    BUN 20 02/01/2024    CREATININE 1.06 (H) 02/01/2024    CALCIUM 9.4 02/01/2024    ALBUMIN 4.2 02/01/2024        Lab Results   Component Value Date    HGBA1C 5.4 02/01/2024     I have reviewed the above labs & diagnostics    Last Recorded Vitals:  Vitals:    05/08/24 1601   BP: 120/56  "  BP Location: Left arm   Patient Position: Sitting   Pulse: 61   SpO2: 95%   Weight: 71 kg (156 lb 9.6 oz)   Height: 1.676 m (5' 6\")     Physical Exam:  On exam Ms. Karen Patel appears her stated age, is alert and oriented x3, and in no acute distress. Her sclera are anicteric and her oropharynx has moist mucous membranes. Her neck is supple and without thyromegaly. + right carotid bruit. The JVP is ~5 cm of water above the right atrium. Her cardiac exam has regular rhythm, normal S1, S2. No S3/4. 1/6 systolic murmur LLSB. Her lungs are clear to auscultation bilaterally and there is no dullness to percussion. Her abdomen is soft, nontender with normoactive bowel sounds. There is no HJR. The extremities are warm and without edema. The skin is dry. There is no rash present. The distal pulses are 2-3+ in all four extremities. Her mood and affect are appropriate for todays encounter.     Assessment/Plan:  1) CAD: Stable from a symptom standpoint. Continue to observe. Last catheterization 2017. No symptoms concerning for ischemia since then. Based on the ISCHEMIA Trial no need for routine stress test  - c/w ASA, statin & BB.     2) dyslipidemia: LDL above goal. Increasing atorvastatin vs adding Zetia has been discussed on previous visits with Dr. Simpson. Jacqueline is willing to trial an increase in her Atorvastatin to 80mg at bedtime. LFTs in 2 weeks     3) hypertension: Well controlled on current regimen.     4) atrial fibrillation / flutter: No symptomatic palpitations. Does occasionally get notifications of elevated heart rate/atrial fibrillation by her watch.   - Dr. Simpson wanted her to see EP in the past to discuss best antiarrhythmic & if she should continue amio. She didn't make it to that EP appointment. Refer her to Dr. Earl for further evaluation.  - recommend noodls mobile device as apple watch picks up PACs as afib.  - for now, c/w amiodarone 100mg every day  - c/w Coreg 6.25mg BID  - c/w Xarelto 15mg every " day      5) bilateral carotid stenosis. Notable bruit right carotid. Follow with vascular surgery    6) Murmur. Echo in 2022 documented moderate MR. She remains asymptomatic. Would repeat echo ~2025 if she remains asymptomatic    Follow up with Dr. Simpson in 6 months. At which time a lipid panel should be repeated  Tracy M Schwab, APRN-CNP

## 2024-05-17 ENCOUNTER — LAB (OUTPATIENT)
Dept: LAB | Facility: LAB | Age: 84
End: 2024-05-17
Payer: MEDICARE

## 2024-05-17 DIAGNOSIS — E78.5 HYPERLIPIDEMIA, UNSPECIFIED HYPERLIPIDEMIA TYPE: ICD-10-CM

## 2024-05-17 LAB
ALBUMIN SERPL BCP-MCNC: 4 G/DL (ref 3.4–5)
ALP SERPL-CCNC: 58 U/L (ref 33–136)
ALT SERPL W P-5'-P-CCNC: 16 U/L (ref 7–45)
AST SERPL W P-5'-P-CCNC: 16 U/L (ref 9–39)
BILIRUB DIRECT SERPL-MCNC: 0.1 MG/DL (ref 0–0.3)
BILIRUB SERPL-MCNC: 0.4 MG/DL (ref 0–1.2)
PROT SERPL-MCNC: 6.7 G/DL (ref 6.4–8.2)

## 2024-05-17 PROCEDURE — 80076 HEPATIC FUNCTION PANEL: CPT

## 2024-05-17 PROCEDURE — 36415 COLL VENOUS BLD VENIPUNCTURE: CPT

## 2024-06-20 ENCOUNTER — TELEPHONE (OUTPATIENT)
Dept: CARDIOLOGY | Facility: CLINIC | Age: 84
End: 2024-06-20
Payer: MEDICARE

## 2024-06-20 DIAGNOSIS — E78.5 HYPERLIPIDEMIA, UNSPECIFIED HYPERLIPIDEMIA TYPE: ICD-10-CM

## 2024-06-20 RX ORDER — ATORVASTATIN CALCIUM 80 MG/1
80 TABLET, FILM COATED ORAL DAILY
Qty: 90 TABLET | Refills: 3 | Status: SHIPPED | OUTPATIENT
Start: 2024-06-20

## 2024-06-20 NOTE — TELEPHONE ENCOUNTER
Alananh changed Jacqueline's atorvastatin dose to 80 mg daily.  Please send new rx in for 80 mg tabs #90 x 3 to [facility].  Please adjust medication list to reflect correct dose    Thank you

## 2024-06-25 DIAGNOSIS — E78.5 HYPERLIPIDEMIA, UNSPECIFIED HYPERLIPIDEMIA TYPE: Primary | ICD-10-CM

## 2024-06-27 ENCOUNTER — APPOINTMENT (OUTPATIENT)
Dept: CARDIOLOGY | Facility: CLINIC | Age: 84
End: 2024-06-27
Payer: MEDICARE

## 2024-06-27 VITALS
DIASTOLIC BLOOD PRESSURE: 68 MMHG | WEIGHT: 156 LBS | SYSTOLIC BLOOD PRESSURE: 146 MMHG | BODY MASS INDEX: 25.07 KG/M2 | HEIGHT: 66 IN | OXYGEN SATURATION: 95 % | HEART RATE: 56 BPM

## 2024-06-27 DIAGNOSIS — I65.23 BILATERAL CAROTID ARTERY STENOSIS: ICD-10-CM

## 2024-06-27 DIAGNOSIS — R00.2 PALPITATIONS: ICD-10-CM

## 2024-06-27 DIAGNOSIS — I48.92 ATRIAL FLUTTER, UNSPECIFIED TYPE (MULTI): ICD-10-CM

## 2024-06-27 DIAGNOSIS — I10 BENIGN HYPERTENSION: ICD-10-CM

## 2024-06-27 DIAGNOSIS — E78.5 HYPERLIPIDEMIA, UNSPECIFIED HYPERLIPIDEMIA TYPE: ICD-10-CM

## 2024-06-27 DIAGNOSIS — I48.19 PERSISTENT ATRIAL FIBRILLATION (MULTI): Primary | ICD-10-CM

## 2024-06-27 PROBLEM — I77.9 BILATERAL CAROTID ARTERY DISEASE (CMS-HCC): Status: RESOLVED | Noted: 2023-12-14 | Resolved: 2024-06-27

## 2024-06-27 NOTE — PROGRESS NOTES
"    Cardiac Electrophysiology Office Visit     Referred by Dr. Simpson, Alvarez YORK MD for   Chief Complaint   Patient presents with    Atrial Fibrillation    Establish Care     HPI:  Karen Patel is a 83 y.o. year old female patient with h/o CAD s/p PCI (RCA 2017), HLD, coronary artery disease (bilateral), AF presenting today to establish care    Objective  Current Outpatient Medications   Medication Instructions    amiodarone (PACERONE) 100 mg, oral, Daily    amLODIPine (NORVASC) 5 mg, oral, Daily    atorvastatin (LIPITOR) 80 mg, oral, Daily    carvedilol (Coreg) 6.25 mg tablet 1 tablet, oral, 2 times daily (morning and late afternoon)    meloxicam (Mobic) 7.5 mg tablet TAKE 1 TABLET DAILY AS NEEDED FOR ARTHRITIC PAIN    PARoxetine (PAXIL) 20 mg, oral, Daily    rivaroxaban (XARELTO) 15 mg, oral, 2 times daily (morning and late afternoon), Take with food.       Visit Vitals  /68 (BP Location: Right arm, Patient Position: Sitting)   Pulse 56   Ht 1.676 m (5' 6\")   Wt 70.8 kg (156 lb)   SpO2 95%   BMI 25.18 kg/m²   OB Status Postmenopausal   Smoking Status Former   BSA 1.82 m²      Physical Exam  Constitutional:       Appearance: Normal appearance.   HENT:      Head: Normocephalic.   Cardiovascular:      Rate and Rhythm: Normal rate. Rhythm irregular.      Pulses: Normal pulses.   Pulmonary:      Effort: No respiratory distress.      Breath sounds: No wheezing.   Skin:     General: Skin is warm and dry.      Capillary Refill: Capillary refill takes less than 2 seconds.   Neurological:      Mental Status: She is alert.   Psychiatric:         Mood and Affect: Mood normal.           My Interpretation of Reviewed Study(s):  14 day ZioPatch (July 2020) demonstrating 59% atrial fibrillation/flutter burden with average heart rate 86 bpm and range 53-66 bpm. Otherwise sinus rhythm. 10 runs of nonsustained ventricular tachycardia ranging between 4 and 9 beats. 16 runs of supraventricular tachycardia lasting less than 10 " beats.   Echo (August 2022): Normal left ventricular systolic function EF 60-65 normal biatrial size no left atrium mildly dilated right atrium normal size no pericardial effusion  [id number] Score  Age >= 75: 2   Sex Female: 1   CHF History No: 0   HTN Yes: 1   Stroke/TIA/Thromboembolism No: 0   Vascular Dz: CAD/PAD/Aortic Plaque Yes: 1   DM No: 0   Total Score 5     Assessment/Plan   #Persistent atrial fibrillation  AF Dx History: ~1 year ago; h/o Cardioversion: No; AAD Use: Amiodarone 100mg (current); Anticoagulation use: Xarelto 15mg Daily (current); h/o Ablation: None; [id number] Score: 5  2  Patient currently has been taking Xarelto 15 which may be slightly underdosed for her (in the past GFR did drop to 152).  She is completely asymptomatic in atrial fibrillation is and unaware of it and at this time amiodarone 100 mg seems to not be adequate enough for rhythm control.  Our alternatives would be either to increase amiodarone with a cardioversion to attempt to get patient back into sinus rhythm or forego rhythm control and just plan for rate control.  Since patient is relatively asymptomatic with her age I would probably favor rate control strategy and come off amiodarone since it is not of utility for rhythm management at this time.  c/w AC: Xarelto 15mg Daily - Ideally pt should be on Xarelto 20mg Daily (no sig decrease in CrCl)  Increase dose of Coreg to 12.5mg BID  Stop Amiodarone - Rate control strategy    Return to Clinic: Patient should continue to follow with their cardiologist, and can be referred back to me if any changes or new EP concerns arise.     Vitaliy Earl MD [facility]  Cardiac Electrophysiology  [email or web address]    **Disclaimer: This note was dictated by speech recognition, and every effort has been made to prevent any error in transcription, however minor errors may be present**

## 2024-07-02 ENCOUNTER — APPOINTMENT (OUTPATIENT)
Dept: PRIMARY CARE | Facility: CLINIC | Age: 84
End: 2024-07-02
Payer: MEDICARE

## 2024-07-09 DIAGNOSIS — I10 BENIGN HYPERTENSION: Primary | ICD-10-CM

## 2024-07-09 RX ORDER — CARVEDILOL 6.25 MG/1
6.25 TABLET ORAL
Qty: 180 TABLET | Refills: 3 | Status: SHIPPED | OUTPATIENT
Start: 2024-07-09

## 2024-09-16 DIAGNOSIS — M79.606 PAIN IN LEG, UNSPECIFIED: ICD-10-CM

## 2024-09-17 RX ORDER — MELOXICAM 7.5 MG/1
TABLET ORAL
Qty: 90 TABLET | Refills: 2 | Status: SHIPPED | OUTPATIENT
Start: 2024-09-17

## 2024-09-18 ENCOUNTER — LAB (OUTPATIENT)
Dept: LAB | Facility: LAB | Age: 84
End: 2024-09-18
Payer: MEDICARE

## 2024-09-18 DIAGNOSIS — E78.5 HYPERLIPIDEMIA, UNSPECIFIED HYPERLIPIDEMIA TYPE: ICD-10-CM

## 2024-09-18 LAB
ALBUMIN SERPL BCP-MCNC: 4.1 G/DL (ref 3.4–5)
ALP SERPL-CCNC: 55 U/L (ref 33–136)
ALT SERPL W P-5'-P-CCNC: 14 U/L (ref 7–45)
AST SERPL W P-5'-P-CCNC: 15 U/L (ref 9–39)
BILIRUB DIRECT SERPL-MCNC: 0.1 MG/DL (ref 0–0.3)
BILIRUB SERPL-MCNC: 0.6 MG/DL (ref 0–1.2)
PROT SERPL-MCNC: 6.6 G/DL (ref 6.4–8.2)

## 2024-09-18 PROCEDURE — 36415 COLL VENOUS BLD VENIPUNCTURE: CPT

## 2024-09-18 PROCEDURE — 80076 HEPATIC FUNCTION PANEL: CPT

## 2024-10-08 ENCOUNTER — LAB (OUTPATIENT)
Dept: LAB | Facility: LAB | Age: 84
End: 2024-10-08
Payer: MEDICARE

## 2024-10-08 ENCOUNTER — APPOINTMENT (OUTPATIENT)
Dept: PRIMARY CARE | Facility: CLINIC | Age: 84
End: 2024-10-08
Payer: MEDICARE

## 2024-10-08 VITALS
HEART RATE: 56 BPM | WEIGHT: 157.2 LBS | TEMPERATURE: 98 F | DIASTOLIC BLOOD PRESSURE: 60 MMHG | SYSTOLIC BLOOD PRESSURE: 110 MMHG | HEIGHT: 66 IN | RESPIRATION RATE: 16 BRPM | BODY MASS INDEX: 25.26 KG/M2 | OXYGEN SATURATION: 98 %

## 2024-10-08 DIAGNOSIS — R53.83 OTHER FATIGUE: ICD-10-CM

## 2024-10-08 DIAGNOSIS — F41.9 ANXIETY: ICD-10-CM

## 2024-10-08 DIAGNOSIS — R53.83 OTHER FATIGUE: Primary | ICD-10-CM

## 2024-10-08 DIAGNOSIS — E78.2 MIXED HYPERLIPIDEMIA: ICD-10-CM

## 2024-10-08 DIAGNOSIS — I25.10 CORONARY ARTERY DISEASE INVOLVING NATIVE CORONARY ARTERY OF NATIVE HEART WITHOUT ANGINA PECTORIS: ICD-10-CM

## 2024-10-08 DIAGNOSIS — K74.60 CIRRHOSIS OF LIVER WITHOUT ASCITES, UNSPECIFIED HEPATIC CIRRHOSIS TYPE (MULTI): ICD-10-CM

## 2024-10-08 DIAGNOSIS — I10 BENIGN HYPERTENSION: ICD-10-CM

## 2024-10-08 DIAGNOSIS — J43.9 PULMONARY EMPHYSEMA, UNSPECIFIED EMPHYSEMA TYPE (MULTI): ICD-10-CM

## 2024-10-08 DIAGNOSIS — I48.19 PERSISTENT ATRIAL FIBRILLATION (MULTI): ICD-10-CM

## 2024-10-08 LAB
ANION GAP SERPL CALC-SCNC: 12 MMOL/L (ref 10–20)
BUN SERPL-MCNC: 20 MG/DL (ref 6–23)
CALCIUM SERPL-MCNC: 9.9 MG/DL (ref 8.6–10.6)
CHLORIDE SERPL-SCNC: 105 MMOL/L (ref 98–107)
CO2 SERPL-SCNC: 30 MMOL/L (ref 21–32)
CREAT SERPL-MCNC: 1.04 MG/DL (ref 0.5–1.05)
EGFRCR SERPLBLD CKD-EPI 2021: 53 ML/MIN/1.73M*2
ERYTHROCYTE [DISTWIDTH] IN BLOOD BY AUTOMATED COUNT: 13.2 % (ref 11.5–14.5)
GLUCOSE SERPL-MCNC: 102 MG/DL (ref 74–99)
HCT VFR BLD AUTO: 39.7 % (ref 36–46)
HGB BLD-MCNC: 12.3 G/DL (ref 12–16)
MCH RBC QN AUTO: 32.4 PG (ref 26–34)
MCHC RBC AUTO-ENTMCNC: 31 G/DL (ref 32–36)
MCV RBC AUTO: 105 FL (ref 80–100)
NRBC BLD-RTO: 0 /100 WBCS (ref 0–0)
PLATELET # BLD AUTO: 189 X10*3/UL (ref 150–450)
POTASSIUM SERPL-SCNC: 3.9 MMOL/L (ref 3.5–5.3)
RBC # BLD AUTO: 3.8 X10*6/UL (ref 4–5.2)
SODIUM SERPL-SCNC: 143 MMOL/L (ref 136–145)
T4 FREE SERPL-MCNC: 1.09 NG/DL (ref 0.78–1.48)
TSH SERPL-ACNC: 5.02 MIU/L (ref 0.44–3.98)
WBC # BLD AUTO: 5.2 X10*3/UL (ref 4.4–11.3)

## 2024-10-08 PROCEDURE — 1160F RVW MEDS BY RX/DR IN RCRD: CPT | Performed by: INTERNAL MEDICINE

## 2024-10-08 PROCEDURE — 84443 ASSAY THYROID STIM HORMONE: CPT

## 2024-10-08 PROCEDURE — 1036F TOBACCO NON-USER: CPT | Performed by: INTERNAL MEDICINE

## 2024-10-08 PROCEDURE — 1157F ADVNC CARE PLAN IN RCRD: CPT | Performed by: INTERNAL MEDICINE

## 2024-10-08 PROCEDURE — 85027 COMPLETE CBC AUTOMATED: CPT

## 2024-10-08 PROCEDURE — 84439 ASSAY OF FREE THYROXINE: CPT

## 2024-10-08 PROCEDURE — G2211 COMPLEX E/M VISIT ADD ON: HCPCS | Performed by: INTERNAL MEDICINE

## 2024-10-08 PROCEDURE — 80048 BASIC METABOLIC PNL TOTAL CA: CPT

## 2024-10-08 PROCEDURE — 36415 COLL VENOUS BLD VENIPUNCTURE: CPT

## 2024-10-08 PROCEDURE — 1159F MED LIST DOCD IN RCRD: CPT | Performed by: INTERNAL MEDICINE

## 2024-10-08 PROCEDURE — 3074F SYST BP LT 130 MM HG: CPT | Performed by: INTERNAL MEDICINE

## 2024-10-08 PROCEDURE — 3078F DIAST BP <80 MM HG: CPT | Performed by: INTERNAL MEDICINE

## 2024-10-08 PROCEDURE — 99215 OFFICE O/P EST HI 40 MIN: CPT | Performed by: INTERNAL MEDICINE

## 2024-10-08 RX ORDER — SERTRALINE HYDROCHLORIDE 25 MG/1
25 TABLET, FILM COATED ORAL DAILY
Qty: 30 TABLET | Refills: 5 | Status: SHIPPED | OUTPATIENT
Start: 2024-10-08 | End: 2025-04-06

## 2024-10-08 ASSESSMENT — ENCOUNTER SYMPTOMS
NECK STIFFNESS: 0
FACIAL ASYMMETRY: 0
HEADACHES: 0
PSYCHIATRIC NEGATIVE: 1
CARDIOVASCULAR NEGATIVE: 1
HEMATOLOGIC/LYMPHATIC NEGATIVE: 1
APPETITE CHANGE: 0
NUMBNESS: 0
NERVOUS/ANXIOUS: 0
RESPIRATORY NEGATIVE: 1
UNEXPECTED WEIGHT CHANGE: 0
AGITATION: 0
SPEECH DIFFICULTY: 0
COLOR CHANGE: 0
VOICE CHANGE: 0
SORE THROAT: 0
SHORTNESS OF BREATH: 0
DIFFICULTY URINATING: 0
POLYPHAGIA: 0
GASTROINTESTINAL NEGATIVE: 1
ADENOPATHY: 0
WOUND: 0
SINUS PRESSURE: 0
FREQUENCY: 0
NAUSEA: 0
BACK PAIN: 0
ABDOMINAL PAIN: 0
ARTHRALGIAS: 1
DYSURIA: 0
EYE PAIN: 0
CONFUSION: 0
VOMITING: 0
ACTIVITY CHANGE: 0
DIZZINESS: 0
COUGH: 0
ALLERGIC/IMMUNOLOGIC NEGATIVE: 1
BRUISES/BLEEDS EASILY: 0
LIGHT-HEADEDNESS: 0
SEIZURES: 0
EYE DISCHARGE: 0
STRIDOR: 0
BLOOD IN STOOL: 0
SLEEP DISTURBANCE: 0
DIARRHEA: 0
FLANK PAIN: 0
NECK PAIN: 0
CONSTIPATION: 0
SINUS PAIN: 0
JOINT SWELLING: 0
MYALGIAS: 0
FATIGUE: 1
WEAKNESS: 0
PALPITATIONS: 0
EYE REDNESS: 0
ENDOCRINE NEGATIVE: 1
HALLUCINATIONS: 0
POLYDIPSIA: 0
EYES NEGATIVE: 1
TREMORS: 0
NEUROLOGICAL NEGATIVE: 1
TROUBLE SWALLOWING: 0
WHEEZING: 0
CHEST TIGHTNESS: 0

## 2024-10-08 ASSESSMENT — PATIENT HEALTH QUESTIONNAIRE - PHQ9
2. FEELING DOWN, DEPRESSED OR HOPELESS: NOT AT ALL
SUM OF ALL RESPONSES TO PHQ9 QUESTIONS 1 AND 2: 0
1. LITTLE INTEREST OR PLEASURE IN DOING THINGS: NOT AT ALL

## 2024-10-08 NOTE — PROGRESS NOTES
Subjective   Patient ID: Karen Patel is a 84 y.o. female who presents for Follow-up (Pt is here due to a 6 month follow up, pt stated she has leg pain in the upper thigh , pt also stated she is tired majority of the time ).  HPI    This is 83 yo female with very complex medical problems including  CAD, s/p PCI,  carotid disease, a. Fib, lung nodule, liver cirrhosis, osteopenia, OA, anxiety.    We reviewed and discussed her conditions during today's visit.        Patient has been feeling pretty good although somewhat more tired than usual, she  has been complaint with prescribed medications.    We reviewed and discussed details of recent blood work: CBC, CMP, TSH, Lipid panel, Hb A1c, Vit D done in 2023 and 2024. .   Results within acceptable range.    Mammogram in [place] 2024 was negative.    Denies any recent fall or injury, right thigh pain comes and goes.  Small, fat containing right inguinal hernia was noted on previous CT abd/pel in 2021.    Patient has been feeling more tired recently, feels somewhat depressed although reports no change in appetite, no insomnia, no weight loss.   She has been taking paroxetine for several years.  Will switch to sertraline 25 mg for 1 month, then increase to 50 mg.     She has been exercising regularly at the [facility], often feels less motivated in doing so than before.    Patient saw EP cardiology Dr. Earl, I reviewed visit note, advised  increasing Xarelto and Coreg dose, discontinue amiodarone.   I reviewed cardiology visit note.    Her CT abdomen done in September 2021 showed irregular shape of the liver suspicious for cirrhosis. She saw GI  had Fiber scan done which showed diffuse steatosis.   Additionally 10 mm left lower lung nodule noted.     She saw pulmonology Dr. Tyson, had f/u CT and PET scan done. Advised closed monitoring.   Follow up CT chest in March 2023 showed: Stable appearance to a dominant nodule the right lung base when   compared to 08/01/2022, and 01/19/2022.  There are miniscule 1-2 mm nodules scattered elsewhere likely benign.         Follows with  GI and pulmonary specialists.      Patient has been following with vascular surgeon regarding carotid stenosis.      Her DEXA scan in June 2021 showed osteopenia.      Due to concern about left leg pain and some swelling behind left knee she had venous duplex done in June 2020 which was negative.  Subsequently saw ortho Dr. Whyte , had X-ray , diagnosed with mild arthritis. Her symptoms improved/resolved.      Her brother was diagnosed with advanced bladder ca, previously had lung ca and recovered from it.    Patient follows with  cardiologist, previously diagnosed with atrial flutter, advised Amiodarone and Xarelto 15 mg daily.  I reviewed recent cardiology note (Dr. Simpson: Sept 2023).     Carotid duplex showed bilateral carotid disease 50-69% stenosis with heterogenous plague.  Follows with vascular surgeon.     Currently on Xarelto as recommended by cardiologist. Denies any excessive bleeding or bruising.        Review of Systems   Constitutional:  Positive for fatigue. Negative for activity change, appetite change and unexpected weight change.   HENT: Negative.  Negative for congestion, ear discharge, ear pain, hearing loss, mouth sores, nosebleeds, sinus pressure, sinus pain, sore throat, trouble swallowing and voice change.    Eyes: Negative.  Negative for pain, discharge, redness and visual disturbance.   Respiratory: Negative.  Negative for cough, chest tightness, shortness of breath, wheezing and stridor.    Cardiovascular: Negative.  Negative for chest pain, palpitations and leg swelling.   Gastrointestinal: Negative.  Negative for abdominal pain, blood in stool, constipation, diarrhea, nausea and vomiting.   Endocrine: Negative.  Negative for polydipsia, polyphagia and polyuria.   Genitourinary: Negative.  Negative for difficulty urinating, dysuria, flank pain, frequency and urgency.   Musculoskeletal:   "Positive for arthralgias. Negative for back pain, gait problem, joint swelling, myalgias, neck pain and neck stiffness.   Skin: Negative.  Negative for color change, rash and wound.   Allergic/Immunologic: Negative.  Negative for environmental allergies, food allergies and immunocompromised state.   Neurological: Negative.  Negative for dizziness, tremors, seizures, syncope, facial asymmetry, speech difficulty, weakness, light-headedness, numbness and headaches.   Hematological: Negative.  Negative for adenopathy. Does not bruise/bleed easily.   Psychiatric/Behavioral: Negative.  Negative for agitation, behavioral problems, confusion, hallucinations, sleep disturbance and suicidal ideas. The patient is not nervous/anxious.    All other systems reviewed and are negative.      Objective     Review of systems was performed and all systems were negative except what in HPI    /60 (BP Location: Left arm, Patient Position: Sitting, BP Cuff Size: Adult)   Pulse 56   Temp 36.7 °C (98 °F) (Temporal)   Resp 16   Ht 1.676 m (5' 6\")   Wt 71.3 kg (157 lb 3.2 oz)   SpO2 98%   BMI 25.37 kg/m²    Physical Exam  Vitals and nursing note reviewed.   Constitutional:       General: She is not in acute distress.     Appearance: Normal appearance.   HENT:      Head: Normocephalic and atraumatic.      Right Ear: External ear normal.      Left Ear: External ear normal.      Nose: Nose normal. No congestion or rhinorrhea.   Eyes:      General:         Right eye: No discharge.         Left eye: No discharge.      Extraocular Movements: Extraocular movements intact.      Conjunctiva/sclera: Conjunctivae normal.      Pupils: Pupils are equal, round, and reactive to light.   Cardiovascular:      Rate and Rhythm: Normal rate and regular rhythm.      Pulses: Normal pulses.      Heart sounds: Normal heart sounds. No murmur heard.     No friction rub. No gallop.   Pulmonary:      Effort: Pulmonary effort is normal. No respiratory " distress.      Breath sounds: Normal breath sounds. No stridor. No wheezing, rhonchi or rales.   Chest:      Chest wall: No tenderness.   Abdominal:      General: Bowel sounds are normal.      Palpations: Abdomen is soft. There is no mass.      Tenderness: There is no abdominal tenderness. There is no guarding or rebound.   Musculoskeletal:         General: No swelling or deformity. Normal range of motion.      Cervical back: Normal range of motion and neck supple. No rigidity or tenderness.      Right lower leg: No edema.      Left lower leg: No edema.   Lymphadenopathy:      Cervical: No cervical adenopathy.   Skin:     General: Skin is warm and dry.      Coloration: Skin is not jaundiced.      Findings: No bruising or erythema.   Neurological:      General: No focal deficit present.      Mental Status: She is alert and oriented to person, place, and time. Mental status is at baseline.      Cranial Nerves: No cranial nerve deficit.      Motor: No weakness.      Coordination: Coordination normal.      Gait: Gait normal.   Psychiatric:         Mood and Affect: Mood normal.         Behavior: Behavior normal.         Thought Content: Thought content normal.         Judgment: Judgment normal.         Assessment/Plan   Problem List Items Addressed This Visit             ICD-10-CM       Cardiac and Vasculature    Benign hypertension I10     Controlled. Continue current treatment.          CAD (coronary artery disease) I25.10     Clinically stable. F/up with cardiology.          Hyperlipidemia E78.5     Continue statin.         Persistent atrial fibrillation (Multi) I48.19     Clinically stable. F/up with cardiology.             Gastrointestinal and Abdominal    Liver cirrhosis (Multi) K74.60     Clinically stable. Will monitor.             Mental Health    Anxiety F41.9    Relevant Medications    sertraline (Zoloft) 25 mg tablet       Pulmonary and Pneumonias    Pulmonary emphysema (Multi) J43.9     Clinically stable.  Will monitor.              Symptoms and Signs    Fatigue - Primary R53.83    Relevant Orders    CBC (Completed)    TSH with reflex to Free T4 if abnormal (Completed)    Basic metabolic panel (Completed)   It was a pleasure to see you today.  I would like to remind you about importance of a healthy lifestyle in order to improve your well-being and live longer.  Try to engage in physical activities for at least 150 minutes per week.  Eat about 10 servings of fruits and vegetables daily. My advice is 2 servings of fruits and 8 servings of vegetables.  For vegetables choose at least half of them green and at least half of them fresh.  Please avoid sugar, salt, fried food and saturated fat.      I spent a total of 42 minutes on the date of service for follow up visit, which included preparing to see the patient, face-to-face patient care, completing clinical documentation, obtaining and/or reviewing separately obtained history, performing a medically appropriate examination, counseling and educating the patient/family/caregiver, ordering medications, tests, or procedures, communicating with other health care providers (not separately reported), independently interpreting results (not separately reported), communicating results to the patient/family/caregiver, and care coordination (not separately reported).    F/up in 3 months or sooner if needed.

## 2024-10-13 NOTE — RESULT ENCOUNTER NOTE
Your recent lab work was acceptable/stable. All results may not be within normal range but they are not clinically significant at this time and do not require change in your therapy. We will discuss details during your next office visit. Please keep your next appointment as scheduled. Dr. Rivera

## 2024-10-14 DIAGNOSIS — I10 ESSENTIAL (PRIMARY) HYPERTENSION: ICD-10-CM

## 2024-10-15 RX ORDER — AMLODIPINE BESYLATE 5 MG/1
5 TABLET ORAL DAILY
Qty: 90 TABLET | Refills: 3 | Status: SHIPPED | OUTPATIENT
Start: 2024-10-15

## 2024-11-11 PROBLEM — R09.89 CAROTID BRUIT: Status: RESOLVED | Noted: 2024-01-02 | Resolved: 2024-11-11

## 2024-11-11 NOTE — PROGRESS NOTES
Chief Complaint:   No chief complaint on file.     History Of Present Illness:    Karen Patel is a 84 y.o. female with a history of CAD (PCI of RCA 4/6/17), dyslipidemia, bilateral carotid artery disease, and paroxysmal atrial fibrillation being evaluated for routine follow-up.     Has been feeling depressed.  Was trialed on sertraline but went back to paroxetine because the sertraline was not working.  Does complain also of pain in her leg muscles of the thighs and upper lower legs when she walks.  If she stops and rests it goes away.  Denies any pain at night in the legs.  Has not had any significant wounds or nonhealing ulcers of the legs.    Denies any exertional chest pain or shortness of breath.     Reviewed Dr. Earl's note from June 2024 and Tracy Schwab's note from May 2024.     Echocardiogram 8/19/2022: EF 60 to 65%. Grade 1 diastolic dysfunction. Moderate left atrial enlargement. Moderate MR. Mild AR.     Carotid duplex June 2022 with less than 50% stenosis bilaterally.     14 day ZioPatch 7/14/20 demonstrating 59% atrial fibrillation/flutter burden with average heart rate 86 bpm and range  bpm. Otherwise sinus rhythm. 10 runs of nonsustained ventricular tachycardia ranging between 4 and 9 beats. 16 runs of supraventricular tachycardia lasting less than 10 beats.     Carotid duplex 5/21/2020 demonstrating 50-69% stenosis of the right proximal ICA. 50-69% stenosis of the left proximal ICA.     Catheterization 4/6/17 performed for abnormal stress test demonstrating severe RCA stenosis. Underwent PCI of the RCA with overlapping 2.5 x 26 mm and 2.75 x 18 mm drug-eluting stents in the mid vessel. 2.75 x 8 mm Resolute MARILIN in the ostium in a nonoverlapping fashion. LAD 30% proximal stenosis. Ramus intermedius branch angiographically normal. Nondominant circumflex angiographically normal. LVEDP 5 mmHg. EF 60%. No AS and no MR.      Echocardiogram 3/3/16 demonstrating normal LV size and function EF 60-65%.  "Mild RV enlargement with normal function. Mild right atrial enlargement. Mild aortic stenosis. Mild to moderate MR. Mild TR and IA.      Allergies:  Sulfa (sulfonamide antibiotics)    Outpatient Medications:  Current Outpatient Medications   Medication Instructions    amLODIPine (NORVASC) 5 mg, oral, Daily    atorvastatin (LIPITOR) 80 mg, oral, Daily    carvedilol (COREG) 6.25 mg, oral, 2 times daily after meals    meloxicam (Mobic) 7.5 mg tablet TAKE 1 TABLET DAILY AS NEEDED FOR ARTHRITIC PAIN    rivaroxaban (XARELTO) 20 mg, oral, Daily with evening meal, Take with food.    sertraline (ZOLOFT) 25 mg, oral, Daily       Last Recorded Vitals:  Visit Vitals  /76 (BP Location: Left arm, Patient Position: Sitting)   Pulse 80   Ht 1.676 m (5' 6\")   Wt 70.3 kg (155 lb)   SpO2 97%   BMI 25.02 kg/m²   OB Status Postmenopausal   Smoking Status Former   BSA 1.81 m²      LASTWT(3):   Wt Readings from Last 3 Encounters:   11/12/24 70.3 kg (155 lb)   10/08/24 71.3 kg (157 lb 3.2 oz)   06/27/24 70.8 kg (156 lb)       Physical Exam:  In general: alert and in no acute distress.   HEENT: Bilateral carotid bruits. JVP is normal.   Pulmonary: Clear to auscultation bilaterally.  Cardiovascular: S1, S2, regular. No appreciable murmurs, rubs or gallops.   Lower extremities: Warm. 1+ distal pulses.  Trace right lower extremity edema.       Last Labs:  CBC -  Recent Labs     10/08/24  1400 02/01/24  0941 03/24/23  0816   WBC 5.2 4.8 5.9   HGB 12.3 12.1 14.0   HCT 39.7 37.7 44.0    168 185   * 103* 102*       CMP -  Recent Labs     10/08/24  1400 02/01/24  0941 03/24/23  0816    142 142   K 3.9 4.3 3.7    105 103   CO2 30 29 28   ANIONGAP 12 12 15   BUN 20 20 18   CREATININE 1.04 1.06* 1.01   EGFR 53* 52*  --      Recent Labs     09/18/24  1043 05/17/24  0642 02/01/24  0941   ALBUMIN 4.1 4.0 4.2   ALKPHOS 55 58 58   ALT 14 16 13   AST 15 16 15   BILITOT 0.6 0.4 0.7       LIPID PANEL -   Recent Labs     " 02/01/24  0941 03/24/23  0816 01/13/22  0702 08/06/21  0710   CHOL 157 183 170 171   LDLCALC 80  --   --   --    LDLF  --  99 83 87   HDL 56.7 62.1 64.0 65.0   TRIG 100 112 115 96       Recent Labs     02/01/24  0941 03/24/23  0816 01/13/22  0702   HGBA1C 5.4 5.5 5.3           Assessment/Plan   1) CAD: PCI to the RCA 2017.  Stable from a symptom standpoint. Continue to observe. Last catheterization 2017. No symptoms concerning for ischemia since then. Based on the ISCHEMIA Trial no need for routine stress test     2) dyslipidemia: Although her LDL is near goal I would like her to stop the atorvastatin for now to see if it helps with the muscle pains in her legs.  If not then I would like to get PVRs with exercise as described below.     3) hypertension: Blood pressure well-controlled with her carvedilol and amlodipine.  Continue the carvedilol at 6.25 mg twice a day and amlodipine at 5 mg daily.     4) atrial fibrillation / flutter: No symptomatic palpitations.      5) bilateral carotid stenosis: Follow with vascular surgery.  Was supposed have a carotid ultrasound but this was not done.  I will order a carotid ultrasound and then ask her to follow with Dr. Goss.     6) claudication: 1+ distal pulses.  He is very possible that the pain in her legs could be from the statin.  We will try holding the statin for a week and if no improvement then would get PVRs with exercise and ask her to follow-up with Dr. Goss as above.    7) follow-up: 6 months or sooner if needed       Alvarez Simpson MD

## 2024-11-12 ENCOUNTER — APPOINTMENT (OUTPATIENT)
Dept: CARDIOLOGY | Facility: CLINIC | Age: 84
End: 2024-11-12
Payer: MEDICARE

## 2024-11-12 VITALS
SYSTOLIC BLOOD PRESSURE: 122 MMHG | DIASTOLIC BLOOD PRESSURE: 76 MMHG | BODY MASS INDEX: 24.91 KG/M2 | HEIGHT: 66 IN | HEART RATE: 80 BPM | WEIGHT: 155 LBS | OXYGEN SATURATION: 97 %

## 2024-11-12 DIAGNOSIS — I73.9 CLAUDICATION (CMS-HCC): ICD-10-CM

## 2024-11-12 DIAGNOSIS — I10 BENIGN HYPERTENSION: ICD-10-CM

## 2024-11-12 DIAGNOSIS — I25.10 CORONARY ARTERY DISEASE INVOLVING NATIVE CORONARY ARTERY OF NATIVE HEART WITHOUT ANGINA PECTORIS: Primary | ICD-10-CM

## 2024-11-12 DIAGNOSIS — I65.23 BILATERAL CAROTID ARTERY STENOSIS: ICD-10-CM

## 2024-11-12 DIAGNOSIS — I48.19 PERSISTENT ATRIAL FIBRILLATION (MULTI): ICD-10-CM

## 2024-11-12 DIAGNOSIS — E78.5 DYSLIPIDEMIA: ICD-10-CM

## 2024-11-12 PROCEDURE — 3078F DIAST BP <80 MM HG: CPT | Performed by: INTERNAL MEDICINE

## 2024-11-12 PROCEDURE — 1126F AMNT PAIN NOTED NONE PRSNT: CPT | Performed by: INTERNAL MEDICINE

## 2024-11-12 PROCEDURE — 1157F ADVNC CARE PLAN IN RCRD: CPT | Performed by: INTERNAL MEDICINE

## 2024-11-12 PROCEDURE — 1159F MED LIST DOCD IN RCRD: CPT | Performed by: INTERNAL MEDICINE

## 2024-11-12 PROCEDURE — 3074F SYST BP LT 130 MM HG: CPT | Performed by: INTERNAL MEDICINE

## 2024-11-12 PROCEDURE — 1160F RVW MEDS BY RX/DR IN RCRD: CPT | Performed by: INTERNAL MEDICINE

## 2024-11-12 PROCEDURE — 99214 OFFICE O/P EST MOD 30 MIN: CPT | Performed by: INTERNAL MEDICINE

## 2024-11-12 PROCEDURE — 1036F TOBACCO NON-USER: CPT | Performed by: INTERNAL MEDICINE

## 2024-11-12 RX ORDER — PAROXETINE HYDROCHLORIDE 20 MG/1
20 TABLET, FILM COATED ORAL EVERY MORNING
COMMUNITY

## 2024-11-12 ASSESSMENT — ENCOUNTER SYMPTOMS
LOSS OF SENSATION IN FEET: 0
DEPRESSION: 1
OCCASIONAL FEELINGS OF UNSTEADINESS: 0

## 2024-11-12 ASSESSMENT — PAIN SCALES - GENERAL: PAINLEVEL_OUTOF10: 0-NO PAIN

## 2024-11-19 ENCOUNTER — TELEPHONE (OUTPATIENT)
Dept: CARDIOLOGY | Facility: CLINIC | Age: 84
End: 2024-11-19
Payer: MEDICARE

## 2024-11-19 NOTE — TELEPHONE ENCOUNTER
Patient called states 1 week ago Dr Simpson advised her to stop the atorvastatin 80 mg and report how she felt in one week.    Patient states she feel good.     She wants to know if Dr Simpson wanted to start her on a new medication.     Pharmacy: [facility].     Patient P [phone].    Please advise.

## 2024-11-20 ENCOUNTER — TELEPHONE (OUTPATIENT)
Dept: VASCULAR SURGERY | Facility: CLINIC | Age: 84
End: 2024-11-20
Payer: MEDICARE

## 2024-11-20 DIAGNOSIS — E78.5 DYSLIPIDEMIA: Primary | ICD-10-CM

## 2024-11-20 RX ORDER — ROSUVASTATIN CALCIUM 20 MG/1
20 TABLET, COATED ORAL DAILY
Qty: 30 TABLET | Refills: 11 | Status: SHIPPED | OUTPATIENT
Start: 2024-11-20 | End: 2025-11-20

## 2024-11-20 NOTE — TELEPHONE ENCOUNTER
Patient called seeing if Dr Simpson got her message from yesterday when she called the office in regards to him stopping atorvastatin 80 mg for a week and what should she do now? Pt is wondering if Dr Simpson is going to start her on new medication. please advise.

## 2024-12-06 ENCOUNTER — HOSPITAL ENCOUNTER (OUTPATIENT)
Dept: CARDIOLOGY | Facility: HOSPITAL | Age: 84
Discharge: HOME | End: 2024-12-06
Payer: MEDICARE

## 2024-12-06 DIAGNOSIS — I73.9 CLAUDICATION (CMS-HCC): ICD-10-CM

## 2024-12-06 DIAGNOSIS — I65.23 BILATERAL CAROTID ARTERY STENOSIS: ICD-10-CM

## 2024-12-06 DIAGNOSIS — R09.89 OTHER SPECIFIED SYMPTOMS AND SIGNS INVOLVING THE CIRCULATORY AND RESPIRATORY SYSTEMS: ICD-10-CM

## 2024-12-06 PROCEDURE — 93924 LWR XTR VASC STDY BILAT: CPT

## 2024-12-06 PROCEDURE — 93924 LWR XTR VASC STDY BILAT: CPT | Performed by: STUDENT IN AN ORGANIZED HEALTH CARE EDUCATION/TRAINING PROGRAM

## 2024-12-06 PROCEDURE — 93880 EXTRACRANIAL BILAT STUDY: CPT | Performed by: STUDENT IN AN ORGANIZED HEALTH CARE EDUCATION/TRAINING PROGRAM

## 2024-12-06 PROCEDURE — 93880 EXTRACRANIAL BILAT STUDY: CPT

## 2024-12-09 ENCOUNTER — TELEPHONE (OUTPATIENT)
Dept: CARDIOLOGY | Facility: CLINIC | Age: 84
End: 2024-12-09
Payer: MEDICARE

## 2024-12-09 NOTE — TELEPHONE ENCOUNTER
Result Communication    Resulted Orders   Vascular US PVR with exercise    Narrative                [facility] - [place], [place]  [address] [place] [address]      Tel [phone] Fax [phone]       Vascular Lab Report     VASC US PVR WITH EXERCISE    Patient Name:     CARLOS HINOJOSA CHERRY Thacker Physician: [id number] Gretel Cruz MD  Study Date:       12/6/2024           Ordering Provider: [id number] TESSA GERBER  MRN/PID:          [id number]            Fellow:  Accession#:       [id number]        Technologist:      Alannah Rizzo RVT  Date of           1940 / 84      Technologist 2:  Birth/Age:        years  Gender:           F                   Encounter#:        [id number]  Admission Status: Outpatient          Location           [facility]                                        Performed:       Diagnosis/ICD: Peripheral vascular disease, unspecified-I73.9  CPT Codes:     44564 Peripheral artery PVR with exercise       Pertinent History: Hyperlipidemia, HTN, CAD and TIA.       CONCLUSIONS:  Bilateral Lower PVR: No evidence of arterial occlusive disease bilaterally in the lower extremities at rest and with exercise.  Right Lower PVR: No evidence of arterial occlusive disease in the right lower extremity at rest and with exercise. Multiphasic flow is noted in the right common femoral artery, right popliteal artery, right posterior tibial artery and right dorsalis pedis artery. Normal PVR waveforms and tracings.  Left Lower PVR: Multiphasic flow is noted in the left common femoral artery, left popliteal artery, left posterior tibial artery and left dorsalis pedis artery. Normal PVR waveforms and tracings.  Exercise:Exercise completed at 2 miles per hour for 5 minutes. Post exercise PVR waveforms are normal.     Imaging & Doppler Findings:     RIGHT Lower PVR                Pressures Ratios  Right High Thigh               173 mmHg   1.12  Right Low Thigh                167 mmHg  1.08  Right Calf                     160 mmHg  1.03  Right Posterior Tibial (Ankle) 163 mmHg  1.05  Right Dorsalis Pedis (Ankle)   153 mmHg  0.99  Right Digit (Great Toe)        137 mmHg  0.88     Right Ankle Post Exercise      173 mmHg  1.19       LEFT Lower PVR                Pressures Ratios  Left High Thigh               170 mmHg  1.10  Left Low Thigh                175 mmHg  1.13  Left Calf                     188 mmHg  1.21  Left Posterior Tibial (Ankle) 161 mmHg  1.04  Left Dorsalis Pedis (Ankle)   166 mmHg  1.07  Left Digit (Great Toe)        159 mmHg  1.03     Left Ankle Post Exercise      183 mmHg  1.26                          Right     Left  Brachial Pressure 141 mmHg 155 mmHg          [id number] Gretel Cruz MD  Electronically signed by [id number] Gretel Cruz MD on 12/8/2024 at 8:51:25 AM         ** Final **         2:06 PM      Results were successfully communicated with the patient via [email or web address]

## 2024-12-09 NOTE — TELEPHONE ENCOUNTER
----- Message from Alvarez Simpson sent at 12/9/2024  1:39 PM EST -----  Please let the patient know that the test on the leg arteries and the carotid arteries both look good.  I would still like her to see Dr. Goss but there is nothing else we need to do in the meantime.

## 2024-12-16 DIAGNOSIS — F41.9 ANXIETY: Primary | ICD-10-CM

## 2024-12-16 RX ORDER — PAROXETINE HYDROCHLORIDE 20 MG/1
20 TABLET, FILM COATED ORAL DAILY
Qty: 90 TABLET | Refills: 3 | Status: SHIPPED | OUTPATIENT
Start: 2024-12-16

## 2025-01-07 ENCOUNTER — HOSPITAL ENCOUNTER (OUTPATIENT)
Dept: RADIOLOGY | Facility: CLINIC | Age: 85
Discharge: HOME | End: 2025-01-07
Payer: MEDICARE

## 2025-01-07 ENCOUNTER — APPOINTMENT (OUTPATIENT)
Dept: PRIMARY CARE | Facility: CLINIC | Age: 85
End: 2025-01-07
Payer: MEDICARE

## 2025-01-07 VITALS
DIASTOLIC BLOOD PRESSURE: 60 MMHG | HEIGHT: 66 IN | WEIGHT: 156.6 LBS | HEART RATE: 65 BPM | BODY MASS INDEX: 25.17 KG/M2 | TEMPERATURE: 98 F | RESPIRATION RATE: 16 BRPM | OXYGEN SATURATION: 98 % | SYSTOLIC BLOOD PRESSURE: 110 MMHG

## 2025-01-07 DIAGNOSIS — Z00.00 ROUTINE GENERAL MEDICAL EXAMINATION AT HEALTH CARE FACILITY: Primary | ICD-10-CM

## 2025-01-07 DIAGNOSIS — D64.9 ANEMIA, UNSPECIFIED TYPE: ICD-10-CM

## 2025-01-07 DIAGNOSIS — M25.511 RIGHT SHOULDER PAIN, UNSPECIFIED CHRONICITY: ICD-10-CM

## 2025-01-07 DIAGNOSIS — N18.31 CKD STAGE G3A/A1, GFR 45-59 AND ALBUMIN CREATININE RATIO <30 MG/G (MULTI): ICD-10-CM

## 2025-01-07 DIAGNOSIS — E78.5 DYSLIPIDEMIA: ICD-10-CM

## 2025-01-07 DIAGNOSIS — J43.9 PULMONARY EMPHYSEMA, UNSPECIFIED EMPHYSEMA TYPE (MULTI): ICD-10-CM

## 2025-01-07 DIAGNOSIS — K74.60 CIRRHOSIS OF LIVER WITHOUT ASCITES, UNSPECIFIED HEPATIC CIRRHOSIS TYPE (MULTI): ICD-10-CM

## 2025-01-07 DIAGNOSIS — R73.9 HYPERGLYCEMIA: ICD-10-CM

## 2025-01-07 DIAGNOSIS — I10 BENIGN HYPERTENSION: ICD-10-CM

## 2025-01-07 DIAGNOSIS — I48.19 PERSISTENT ATRIAL FIBRILLATION (MULTI): ICD-10-CM

## 2025-01-07 DIAGNOSIS — Z12.31 ENCOUNTER FOR SCREENING MAMMOGRAM FOR BREAST CANCER: ICD-10-CM

## 2025-01-07 DIAGNOSIS — G83.10 PARESIS OF LOWER EXTREMITY (MULTI): ICD-10-CM

## 2025-01-07 DIAGNOSIS — I25.10 CORONARY ARTERY DISEASE INVOLVING NATIVE CORONARY ARTERY OF NATIVE HEART WITHOUT ANGINA PECTORIS: ICD-10-CM

## 2025-01-07 DIAGNOSIS — F33.1 MAJOR DEPRESSIVE DISORDER, RECURRENT, MODERATE: ICD-10-CM

## 2025-01-07 PROCEDURE — 3074F SYST BP LT 130 MM HG: CPT | Performed by: INTERNAL MEDICINE

## 2025-01-07 PROCEDURE — 99214 OFFICE O/P EST MOD 30 MIN: CPT | Performed by: INTERNAL MEDICINE

## 2025-01-07 PROCEDURE — 73030 X-RAY EXAM OF SHOULDER: CPT | Mod: RT

## 2025-01-07 PROCEDURE — 1157F ADVNC CARE PLAN IN RCRD: CPT | Performed by: INTERNAL MEDICINE

## 2025-01-07 PROCEDURE — 1158F ADVNC CARE PLAN TLK DOCD: CPT | Performed by: INTERNAL MEDICINE

## 2025-01-07 PROCEDURE — 1123F ACP DISCUSS/DSCN MKR DOCD: CPT | Performed by: INTERNAL MEDICINE

## 2025-01-07 PROCEDURE — 73030 X-RAY EXAM OF SHOULDER: CPT | Mod: RIGHT SIDE | Performed by: STUDENT IN AN ORGANIZED HEALTH CARE EDUCATION/TRAINING PROGRAM

## 2025-01-07 PROCEDURE — 1036F TOBACCO NON-USER: CPT | Performed by: INTERNAL MEDICINE

## 2025-01-07 PROCEDURE — G0439 PPPS, SUBSEQ VISIT: HCPCS | Performed by: INTERNAL MEDICINE

## 2025-01-07 PROCEDURE — 3078F DIAST BP <80 MM HG: CPT | Performed by: INTERNAL MEDICINE

## 2025-01-07 PROCEDURE — 1160F RVW MEDS BY RX/DR IN RCRD: CPT | Performed by: INTERNAL MEDICINE

## 2025-01-07 PROCEDURE — 1170F FXNL STATUS ASSESSED: CPT | Performed by: INTERNAL MEDICINE

## 2025-01-07 PROCEDURE — 99397 PER PM REEVAL EST PAT 65+ YR: CPT | Performed by: INTERNAL MEDICINE

## 2025-01-07 PROCEDURE — 1159F MED LIST DOCD IN RCRD: CPT | Performed by: INTERNAL MEDICINE

## 2025-01-07 ASSESSMENT — PATIENT HEALTH QUESTIONNAIRE - PHQ9
SUM OF ALL RESPONSES TO PHQ9 QUESTIONS 1 AND 2: 0
1. LITTLE INTEREST OR PLEASURE IN DOING THINGS: NOT AT ALL
2. FEELING DOWN, DEPRESSED OR HOPELESS: NOT AT ALL

## 2025-01-07 ASSESSMENT — ENCOUNTER SYMPTOMS
FREQUENCY: 0
LOSS OF SENSATION IN FEET: 0
HEMATOLOGIC/LYMPHATIC NEGATIVE: 1
CARDIOVASCULAR NEGATIVE: 1
CONFUSION: 0
NEUROLOGICAL NEGATIVE: 1
WHEEZING: 0
ALLERGIC/IMMUNOLOGIC NEGATIVE: 1
DIFFICULTY URINATING: 0
NERVOUS/ANXIOUS: 0
NUMBNESS: 0
SINUS PRESSURE: 0
DIZZINESS: 0
DEPRESSION: 0
SINUS PAIN: 0
ADENOPATHY: 0
APPETITE CHANGE: 0
VOMITING: 0
FACIAL ASYMMETRY: 0
CHEST TIGHTNESS: 0
STRIDOR: 0
ENDOCRINE NEGATIVE: 1
POLYDIPSIA: 0
BACK PAIN: 0
DYSURIA: 0
MYALGIAS: 0
TROUBLE SWALLOWING: 0
GASTROINTESTINAL NEGATIVE: 1
RESPIRATORY NEGATIVE: 1
EYE REDNESS: 0
HEADACHES: 0
BLOOD IN STOOL: 0
POLYPHAGIA: 0
DIARRHEA: 0
NECK STIFFNESS: 0
EYE PAIN: 0
NAUSEA: 0
CONSTIPATION: 0
COUGH: 0
EYE DISCHARGE: 0
CONSTITUTIONAL NEGATIVE: 1
SHORTNESS OF BREATH: 0
EYES NEGATIVE: 1
PALPITATIONS: 0
WOUND: 0
PSYCHIATRIC NEGATIVE: 1
LIGHT-HEADEDNESS: 0
WEAKNESS: 0
UNEXPECTED WEIGHT CHANGE: 0
SPEECH DIFFICULTY: 0
BRUISES/BLEEDS EASILY: 0
ARTHRALGIAS: 1
ABDOMINAL PAIN: 0
SLEEP DISTURBANCE: 0
SORE THROAT: 0
SEIZURES: 0
HALLUCINATIONS: 0
OCCASIONAL FEELINGS OF UNSTEADINESS: 0
AGITATION: 0
FLANK PAIN: 0
ACTIVITY CHANGE: 0
NECK PAIN: 0
VOICE CHANGE: 0
TREMORS: 0
JOINT SWELLING: 0
COLOR CHANGE: 0

## 2025-01-07 ASSESSMENT — ACTIVITIES OF DAILY LIVING (ADL)
BATHING: INDEPENDENT
DOING_HOUSEWORK: INDEPENDENT
GROCERY_SHOPPING: INDEPENDENT
DRESSING: INDEPENDENT
TAKING_MEDICATION: INDEPENDENT
MANAGING_FINANCES: INDEPENDENT

## 2025-01-07 NOTE — PROGRESS NOTES
Subjective   Reason for Visit: Karen Patel is an 84 y.o. female here for a Medicare Wellness visit.     Past Medical, Surgical, and Family History reviewed and updated in chart.    Reviewed all medications by prescribing practitioner or clinical pharmacist (such as prescriptions, OTCs, herbal therapies and supplements) and documented in the medical record.    [facility] [place]    Patient Care Team:  Giovanna Rowe MD PhD as PCP - General  Giovanna Rowe MD PhD as PCP - [employer]age PCP  Tracy M Schwab, APRN-CNP as Nurse Practitioner (Cardiology)     Patient comes for Medicare Wellness, Physical Exam and Follow up visit.     Patient has been feeling pretty good and has been complaint with prescribed medications.    C/o right shoulder and upper arm pain, worse with certain movements.  No recent fall or injury.    We reviewed blood work including CBC, CMP, TSH, Lipid Panel, HbA1c, Vit D level done in 2024.  Results within acceptable range.    Mammogram: 4/2024  DEXA: 2021: osteopenia  Colon ca screening: n/a  Pneumonia Vacc: 2024  Advance Directives: Healthcare Living Will and POA on file.      This is 83 yo female with very complex medical problems including  CAD, s/p PCI,  carotid disease, a. Fib, lung nodule, liver cirrhosis, osteopenia, OA, anxiety.     We reviewed and discussed her conditions during today's visit.     Patient was feeling more tired in 2024 felt somewhat depressed although reports no change in appetite, no insomnia, no weight loss.   She has been taking paroxetine for several years.  We  switched to sertraline but she did not like the way it made her feel, went back on Paroxetine.     She has been exercising regularly at the [facility], often feels less motivated in doing so than before.     Patient follows with cardiologist Dr. Simpson, previously diagnosed with atrial flutter, advised Amiodarone and Xarelto 15 mg daily.   Patient saw EP cardiology Dr. Earl, advised  increasing Xarelto and  Coreg dose, discontinue amiodarone.      Her CT abdomen done in September 2021 showed irregular shape of the liver suspicious for cirrhosis. She saw GI  had Fiber scan done which showed diffuse steatosis.   Additionally 10 mm left lower lung nodule noted.      She saw pulmonology Dr. Tyson, had f/u CT and PET scan done. Advised closed monitoring.   Follow up CT chest in March 2023 showed: Stable appearance to a dominant nodule the right lung base when   compared to 08/01/2022, and 01/19/2022. There are miniscule 1-2 mm nodules scattered elsewhere likely benign.            Follows with  GI and pulmonary specialists.      Patient has been following with vascular surgeon regarding carotid stenosis.       Review of Systems   Constitutional: Negative.  Negative for activity change, appetite change and unexpected weight change.   HENT: Negative.  Negative for congestion, ear discharge, ear pain, hearing loss, mouth sores, nosebleeds, sinus pressure, sinus pain, sore throat, trouble swallowing and voice change.    Eyes: Negative.  Negative for pain, discharge, redness and visual disturbance.   Respiratory: Negative.  Negative for cough, chest tightness, shortness of breath, wheezing and stridor.    Cardiovascular: Negative.  Negative for chest pain, palpitations and leg swelling.   Gastrointestinal: Negative.  Negative for abdominal pain, blood in stool, constipation, diarrhea, nausea and vomiting.   Endocrine: Negative.  Negative for polydipsia, polyphagia and polyuria.   Genitourinary: Negative.  Negative for difficulty urinating, dysuria, flank pain, frequency and urgency.   Musculoskeletal:  Positive for arthralgias. Negative for back pain, gait problem, joint swelling, myalgias, neck pain and neck stiffness.   Skin: Negative.  Negative for color change, rash and wound.   Allergic/Immunologic: Negative.  Negative for environmental allergies, food allergies and immunocompromised state.   Neurological: Negative.  Negative  "for dizziness, tremors, seizures, syncope, facial asymmetry, speech difficulty, weakness, light-headedness, numbness and headaches.   Hematological: Negative.  Negative for adenopathy. Does not bruise/bleed easily.   Psychiatric/Behavioral: Negative.  Negative for agitation, behavioral problems, confusion, hallucinations, sleep disturbance and suicidal ideas. The patient is not nervous/anxious.    All other systems reviewed and are negative.      Objective   Vitals:  /60 (BP Location: Left arm, Patient Position: Sitting, BP Cuff Size: Adult)   Pulse 65   Temp 36.7 °C (98 °F) (Temporal)   Resp 16   Ht 1.676 m (5' 6\")   Wt 71 kg (156 lb 9.6 oz)   SpO2 98%   BMI 25.28 kg/m²       Physical Exam  Vitals and nursing note reviewed.   Constitutional:       General: She is not in acute distress.     Appearance: Normal appearance.   HENT:      Head: Normocephalic and atraumatic.      Right Ear: Tympanic membrane, ear canal and external ear normal.      Left Ear: Tympanic membrane, ear canal and external ear normal.      Nose: Nose normal. No congestion or rhinorrhea.      Mouth/Throat:      Mouth: Mucous membranes are moist.      Pharynx: Oropharynx is clear.   Eyes:      General:         Right eye: No discharge.         Left eye: No discharge.      Extraocular Movements: Extraocular movements intact.      Conjunctiva/sclera: Conjunctivae normal.      Pupils: Pupils are equal, round, and reactive to light.   Cardiovascular:      Rate and Rhythm: Normal rate and regular rhythm.      Pulses: Normal pulses.      Heart sounds: Normal heart sounds. No murmur heard.     No friction rub. No gallop.   Pulmonary:      Effort: Pulmonary effort is normal. No respiratory distress.      Breath sounds: Normal breath sounds. No stridor. No wheezing, rhonchi or rales.   Chest:      Chest wall: No tenderness.   Abdominal:      General: Bowel sounds are normal.      Palpations: Abdomen is soft. There is no mass.      Tenderness: " There is no abdominal tenderness. There is no guarding or rebound.   Musculoskeletal:         General: No swelling or deformity. Normal range of motion.      Cervical back: Normal range of motion and neck supple. No rigidity or tenderness.      Right lower leg: No edema.      Left lower leg: No edema.   Lymphadenopathy:      Cervical: No cervical adenopathy.   Skin:     General: Skin is warm and dry.      Coloration: Skin is not jaundiced.      Findings: No bruising or erythema.   Neurological:      General: No focal deficit present.      Mental Status: She is alert and oriented to person, place, and time. Mental status is at baseline.      Cranial Nerves: No cranial nerve deficit.      Motor: No weakness.      Coordination: Coordination normal.      Gait: Gait normal.   Psychiatric:         Mood and Affect: Mood normal.         Behavior: Behavior normal.         Thought Content: Thought content normal.         Judgment: Judgment normal.         Assessment & Plan  Routine general medical examination at health care facility    Orders:    1 Year Follow Up In [facility] - PCP - Wellness Exam; Future    Encounter for screening mammogram for breast cancer    Orders:    BI mammo bilateral screening tomosynthesis; Future    Benign hypertension  Controlled. Continue current treatment.          Coronary artery disease involving native coronary artery of native heart without angina pectoris  Clinically stable. F/up with cardiology.          Dyslipidemia  Continue statin.    Orders:    Comprehensive Metabolic Panel; Future    Lipid Panel; Future    Persistent atrial fibrillation (Multi)  Clinically stable. F/up with cardiology.     Orders:    CBC; Future    TSH with reflex to Free T4 if abnormal; Future    CKD stage G3a/A1, GFR 45-59 and albumin creatinine ratio <30 mg/g (Multi)  Clinically stable. Will monitor.         Right shoulder pain, unspecified chronicity    Orders:    XR shoulder right 2+ views; Future     Referral to Orthopaedic Surgery; Future    Hyperglycemia    Orders:    Hemoglobin A1C; Future    Anemia, unspecified type    Orders:    CBC; Future    Cirrhosis of liver without ascites, unspecified hepatic cirrhosis type (Multi)  Clinically stable. Will monitor.          Paresis of lower extremity (Multi)  Clinically stable, will monitor.          Pulmonary emphysema, unspecified emphysema type (Multi)  Clinically stable. Will monitor.       Major depressive disorder, recurrent, moderate  Clinically stable. Continue Paroxetine, Sertraline was not effective.            It was a pleasure to see you today.  I would like to remind you about importance of a healthy lifestyle in order to improve your well-being and live longer.  Try to engage in physical activities for at least 150 minutes per week.  Eat about 10 servings of fruits and vegetables daily. My advice is 2 servings of fruits and 8 servings of vegetables.  For vegetables choose at least half of them green and at least half of them fresh.  Please avoid sugar, salt, fried food and saturated fat.    I spent a total of 30 minutes on the date of service for follow up visit, which included preparing to see the patient, face-to-face patient care, completing clinical documentation, obtaining and/or reviewing separately obtained history, performing a medically appropriate examination, counseling and educating the patient/family/caregiver, ordering medications, tests, or procedures, communicating with other health care providers (not separately reported), independently interpreting results (not separately reported), communicating results to the patient/family/caregiver, and care coordination (not separately reported).    F/up in 6 months or sooner if neded.

## 2025-01-07 NOTE — ASSESSMENT & PLAN NOTE
Clinically stable. F/up with cardiology.     Orders:    CBC; Future    TSH with reflex to Free T4 if abnormal; Future

## 2025-01-11 NOTE — RESULT ENCOUNTER NOTE
Your recent x-ray of right shoulder showed degenerative changes and posssible calcific tendinitis.  Please consult orthopedic surgeon for further evaluation.  Dr. Rivera

## 2025-01-21 ENCOUNTER — LAB (OUTPATIENT)
Dept: LAB | Facility: LAB | Age: 85
End: 2025-01-21
Payer: MEDICARE

## 2025-01-21 DIAGNOSIS — I48.19 PERSISTENT ATRIAL FIBRILLATION (MULTI): ICD-10-CM

## 2025-01-21 DIAGNOSIS — D64.9 ANEMIA, UNSPECIFIED TYPE: ICD-10-CM

## 2025-01-21 DIAGNOSIS — R73.9 HYPERGLYCEMIA: ICD-10-CM

## 2025-01-21 DIAGNOSIS — E78.5 DYSLIPIDEMIA: ICD-10-CM

## 2025-01-21 LAB
ALBUMIN SERPL BCP-MCNC: 4.2 G/DL (ref 3.4–5)
ALP SERPL-CCNC: 53 U/L (ref 33–136)
ALT SERPL W P-5'-P-CCNC: 10 U/L (ref 7–45)
ANION GAP SERPL CALC-SCNC: 12 MMOL/L (ref 10–20)
AST SERPL W P-5'-P-CCNC: 15 U/L (ref 9–39)
BILIRUB SERPL-MCNC: 0.6 MG/DL (ref 0–1.2)
BUN SERPL-MCNC: 16 MG/DL (ref 6–23)
CALCIUM SERPL-MCNC: 10 MG/DL (ref 8.6–10.6)
CHLORIDE SERPL-SCNC: 106 MMOL/L (ref 98–107)
CHOLEST SERPL-MCNC: 152 MG/DL (ref 0–199)
CHOLESTEROL/HDL RATIO: 2.9
CO2 SERPL-SCNC: 30 MMOL/L (ref 21–32)
CREAT SERPL-MCNC: 0.93 MG/DL (ref 0.5–1.05)
EGFRCR SERPLBLD CKD-EPI 2021: 61 ML/MIN/1.73M*2
ERYTHROCYTE [DISTWIDTH] IN BLOOD BY AUTOMATED COUNT: 13.2 % (ref 11.5–14.5)
EST. AVERAGE GLUCOSE BLD GHB EST-MCNC: 108 MG/DL
GLUCOSE SERPL-MCNC: 97 MG/DL (ref 74–99)
HBA1C MFR BLD: 5.4 %
HCT VFR BLD AUTO: 38.5 % (ref 36–46)
HDLC SERPL-MCNC: 52.6 MG/DL
HGB BLD-MCNC: 12.4 G/DL (ref 12–16)
LDLC SERPL CALC-MCNC: 74 MG/DL
MCH RBC QN AUTO: 32.5 PG (ref 26–34)
MCHC RBC AUTO-ENTMCNC: 32.2 G/DL (ref 32–36)
MCV RBC AUTO: 101 FL (ref 80–100)
NON HDL CHOLESTEROL: 99 MG/DL (ref 0–149)
NRBC BLD-RTO: 0 /100 WBCS (ref 0–0)
PLATELET # BLD AUTO: 180 X10*3/UL (ref 150–450)
POTASSIUM SERPL-SCNC: 4.4 MMOL/L (ref 3.5–5.3)
PROT SERPL-MCNC: 6.7 G/DL (ref 6.4–8.2)
RBC # BLD AUTO: 3.81 X10*6/UL (ref 4–5.2)
SODIUM SERPL-SCNC: 144 MMOL/L (ref 136–145)
TRIGL SERPL-MCNC: 125 MG/DL (ref 0–149)
TSH SERPL-ACNC: 3.84 MIU/L (ref 0.44–3.98)
VLDL: 25 MG/DL (ref 0–40)
WBC # BLD AUTO: 4.6 X10*3/UL (ref 4.4–11.3)

## 2025-01-21 PROCEDURE — 83036 HEMOGLOBIN GLYCOSYLATED A1C: CPT

## 2025-01-21 PROCEDURE — 84443 ASSAY THYROID STIM HORMONE: CPT

## 2025-01-21 PROCEDURE — 85027 COMPLETE CBC AUTOMATED: CPT

## 2025-01-21 PROCEDURE — 80061 LIPID PANEL: CPT

## 2025-01-21 PROCEDURE — 80053 COMPREHEN METABOLIC PANEL: CPT

## 2025-01-26 NOTE — RESULT ENCOUNTER NOTE
Your recent lab work was acceptable. All results may not be within normal range but they are not clinically significant at this time and do not require change in your therapy. We will discuss details during your next office visit. Please keep your next appointment as scheduled. Dr. Rivera

## 2025-01-29 ENCOUNTER — APPOINTMENT (OUTPATIENT)
Dept: VASCULAR SURGERY | Facility: CLINIC | Age: 85
End: 2025-01-29
Payer: MEDICARE

## 2025-03-31 NOTE — PATIENT INSTRUCTIONS
Stop the atorvastatin completely for 1 wk to see if the leg pain improves. If not then restart the atorvastatin.    Call us in a week to let us know.     If the pain did not improve then I will order a test on the leg arteries.   4 = No assist / stand by assistance

## 2025-05-22 ENCOUNTER — OFFICE VISIT (OUTPATIENT)
Dept: CARDIOLOGY | Facility: CLINIC | Age: 85
End: 2025-05-22
Payer: MEDICARE

## 2025-05-22 VITALS
DIASTOLIC BLOOD PRESSURE: 62 MMHG | BODY MASS INDEX: 24.91 KG/M2 | HEART RATE: 58 BPM | WEIGHT: 155 LBS | OXYGEN SATURATION: 97 % | SYSTOLIC BLOOD PRESSURE: 102 MMHG | HEIGHT: 66 IN

## 2025-05-22 DIAGNOSIS — I48.19 PERSISTENT ATRIAL FIBRILLATION (MULTI): ICD-10-CM

## 2025-05-22 DIAGNOSIS — I10 BENIGN HYPERTENSION: ICD-10-CM

## 2025-05-22 DIAGNOSIS — I25.10 CAD (CORONARY ARTERY DISEASE): Primary | ICD-10-CM

## 2025-05-22 DIAGNOSIS — I65.23 BILATERAL CAROTID ARTERY STENOSIS: ICD-10-CM

## 2025-05-22 DIAGNOSIS — I25.10 CORONARY ARTERY DISEASE INVOLVING NATIVE CORONARY ARTERY OF NATIVE HEART WITHOUT ANGINA PECTORIS: ICD-10-CM

## 2025-05-22 DIAGNOSIS — E78.5 DYSLIPIDEMIA: ICD-10-CM

## 2025-05-22 PROCEDURE — 99214 OFFICE O/P EST MOD 30 MIN: CPT | Performed by: INTERNAL MEDICINE

## 2025-05-22 PROCEDURE — 1036F TOBACCO NON-USER: CPT | Performed by: INTERNAL MEDICINE

## 2025-05-22 PROCEDURE — 3074F SYST BP LT 130 MM HG: CPT | Performed by: INTERNAL MEDICINE

## 2025-05-22 PROCEDURE — 3078F DIAST BP <80 MM HG: CPT | Performed by: INTERNAL MEDICINE

## 2025-05-22 PROCEDURE — 1159F MED LIST DOCD IN RCRD: CPT | Performed by: INTERNAL MEDICINE

## 2025-05-22 RX ORDER — EZETIMIBE 10 MG/1
10 TABLET ORAL DAILY
Qty: 90 TABLET | Refills: 3 | Status: SHIPPED | OUTPATIENT
Start: 2025-05-22 | End: 2026-05-22

## 2025-05-22 NOTE — PROGRESS NOTES
Some SOB when she walks a lot, states maybe tired vs SOB, but she has to stop. She can get her HR on her watch. Has stairs at home, doesn't get SOB walking up. No lower leg swelling. Lays down flat to sleep. [place] [facility] [place] [facility] [facility] [place]. Walks her dog frequently.

## 2025-05-22 NOTE — PROGRESS NOTES
Chief Complaint:   No chief complaint on file.     History Of Present Illness:    Karen Patel is a 84 y.o. female with a history of CAD (PCI of RCA 4/6/17), dyslipidemia, hypertension, bilateral carotid artery disease, and paroxysmal atrial fibrillation being evaluated for routine follow-up.    Some SOB when she walks a lot, states maybe tired vs SOB, but she has to stop. She can get her HR on her watch. Has stairs at home, doesn't get SOB walking up. No lower leg swelling. Lays down flat to sleep. [place] [facility] [place]. Walks her dog frequently.      States she is asymptomatic when she gets afib. Her watch is what tells her she is in afib. Gets into afib every few days, unknown how long it lasts.     Wishes she had more energy, thinks age-related.     PVRs with exercise 12/6/2024: No evidence of arterial occlusive disease at rest or with exercise.     Echocardiogram 8/19/2022: EF 60 to 65%. Grade 1 diastolic dysfunction. Moderate left atrial enlargement. Moderate MR. Mild AR.     Carotid duplex 12/6/2024: Less than 50% stenosis bilaterally.     14 day ZioPatch 7/14/20 demonstrating 59% atrial fibrillation/flutter burden with average heart rate 86 bpm and range  bpm. Otherwise sinus rhythm. 10 runs of nonsustained ventricular tachycardia ranging between 4 and 9 beats. 16 runs of supraventricular tachycardia lasting less than 10 beats.     Catheterization 4/6/17 performed for abnormal stress test demonstrating severe RCA stenosis. Underwent PCI of the RCA with overlapping 2.5 x 26 mm and 2.75 x 18 mm drug-eluting stents in the mid vessel. 2.75 x 8 mm Resolute MARILIN in the ostium in a nonoverlapping fashion. LAD 30% proximal stenosis. Ramus intermedius branch angiographically normal. Nondominant circumflex angiographically normal. LVEDP 5 mmHg. EF 60%. No AS and no MR.      Allergies:  Sulfa (sulfonamide antibiotics)    Outpatient Medications:  Current Outpatient Medications   Medication Instructions     "amLODIPine (NORVASC) 5 mg, oral, Daily    carvedilol (COREG) 6.25 mg, oral, 2 times daily after meals    meloxicam (Mobic) 7.5 mg tablet TAKE 1 TABLET DAILY AS NEEDED FOR ARTHRITIC PAIN    PARoxetine (PAXIL) 20 mg, oral, Daily    rivaroxaban (XARELTO) 20 mg, oral, Daily with evening meal, Take with food.    rosuvastatin (CRESTOR) 20 mg, oral, Daily       Last Recorded Vitals:  Visit Vitals  /62 (BP Location: Left arm, Patient Position: Sitting)   Pulse 58   Ht 1.676 m (5' 6\")   Wt 70.3 kg (155 lb)   SpO2 97%   BMI 25.02 kg/m²   OB Status Postmenopausal   Smoking Status Former   BSA 1.81 m²      LASTWT(3):   Wt Readings from Last 3 Encounters:   05/22/25 70.3 kg (155 lb)   01/07/25 71 kg (156 lb 9.6 oz)   11/12/24 70.3 kg (155 lb)       Physical Exam:  In general: alert and in no acute distress.   HEENT: Bilateral carotid bruits. JVP is normal.   Pulmonary: Clear to auscultation bilaterally.  Cardiovascular: S1, S2, regular. No appreciable murmurs, rubs or gallops.   Lower extremities: Warm. 1+ distal pulses.  Trivial bilateral lower extremity edema.        Last Labs:  CBC -  Recent Labs     01/21/25  0932 10/08/24  1400 02/01/24  0941   WBC 4.6 5.2 4.8   HGB 12.4 12.3 12.1   HCT 38.5 39.7 37.7    189 168   * 105* 103*       CMP -  Recent Labs     01/21/25  0932 10/08/24  1400 02/01/24  0941    143 142   K 4.4 3.9 4.3    105 105   CO2 30 30 29   ANIONGAP 12 12 12   BUN 16 20 20   CREATININE 0.93 1.04 1.06*   EGFR 61 53* 52*     Recent Labs     01/21/25  0932 09/18/24  1043 05/17/24  0642   ALBUMIN 4.2 4.1 4.0   ALKPHOS 53 55 58   ALT 10 14 16   AST 15 15 16   BILITOT 0.6 0.6 0.4       LIPID PANEL -   Recent Labs     01/21/25  0932 02/01/24  0941 03/24/23  0816 01/13/22  0702 08/06/21  0710   CHOL 152 157 183 170 171   LDLCALC 74 80  --   --   --    LDLF  --   --  99 83 87   HDL 52.6 56.7 62.1 64.0 65.0   TRIG 125 100 112 115 96       Recent Labs     01/21/25  0932 02/01/24  0941 " 03/24/23  0816   HGBA1C 5.4 5.4 5.5           Assessment/Plan   1) CAD: PCI to the RCA 2017.  Stable from a symptom standpoint. Continue to observe. Last catheterization 2017. No symptoms concerning for ischemia since then. Based on the ISCHEMIA Trial no need for routine stress test     2) dyslipidemia: Although her LDL is near goal I would like her to continue rosuvastatin 20 mg and add ezetimibe 10 mg.     She had statin induced myalgias with atorvastatin in the past I would prefer adding ezetimibe instead of increasing rosuvastatin.  If we find that the ezetimibe is too expensive we can always double the rosuvastatin to 40 mg daily.     3) hypertension: Blood pressure well-controlled with her carvedilol and amlodipine.  Continue the carvedilol at 6.25 mg twice a day and amlodipine at 5 mg daily.     4) atrial fibrillation / flutter: No symptomatic palpitations.      5) bilateral carotid stenosis: Follow with vascular surgery.       6) follow-up: 6 months or sooner if needed       Alvarez Simpson MD

## 2025-06-12 DIAGNOSIS — I10 BENIGN HYPERTENSION: ICD-10-CM

## 2025-06-12 RX ORDER — CARVEDILOL 6.25 MG/1
6.25 TABLET ORAL
Qty: 180 TABLET | Refills: 3 | Status: SHIPPED | OUTPATIENT
Start: 2025-06-12

## 2025-07-08 ENCOUNTER — APPOINTMENT (OUTPATIENT)
Dept: PRIMARY CARE | Facility: CLINIC | Age: 85
End: 2025-07-08
Payer: MEDICARE

## 2025-07-16 ENCOUNTER — HOSPITAL ENCOUNTER (OUTPATIENT)
Dept: RADIOLOGY | Facility: CLINIC | Age: 85
Discharge: HOME | End: 2025-07-16
Payer: MEDICARE

## 2025-07-16 DIAGNOSIS — Z12.31 ENCOUNTER FOR SCREENING MAMMOGRAM FOR BREAST CANCER: ICD-10-CM

## 2025-07-16 PROCEDURE — 77063 BREAST TOMOSYNTHESIS BI: CPT | Performed by: RADIOLOGY

## 2025-07-16 PROCEDURE — 77063 BREAST TOMOSYNTHESIS BI: CPT

## 2025-07-16 PROCEDURE — 77067 SCR MAMMO BI INCL CAD: CPT | Performed by: RADIOLOGY

## 2025-07-21 DIAGNOSIS — I48.92 ATRIAL FLUTTER, UNSPECIFIED TYPE (MULTI): ICD-10-CM

## 2025-07-21 DIAGNOSIS — M79.606 PAIN IN LEG, UNSPECIFIED: ICD-10-CM

## 2025-07-21 DIAGNOSIS — E78.5 DYSLIPIDEMIA: ICD-10-CM

## 2025-07-21 RX ORDER — ROSUVASTATIN CALCIUM 20 MG/1
20 TABLET, COATED ORAL DAILY
Qty: 90 TABLET | Refills: 3 | Status: SHIPPED | OUTPATIENT
Start: 2025-07-21

## 2025-07-22 ENCOUNTER — TELEPHONE (OUTPATIENT)
Dept: CARDIOLOGY | Facility: CLINIC | Age: 85
End: 2025-07-22
Payer: MEDICARE

## 2025-07-22 RX ORDER — RIVAROXABAN 20 MG/1
TABLET, FILM COATED ORAL
Qty: 90 TABLET | Refills: 3 | Status: SHIPPED | OUTPATIENT
Start: 2025-07-22

## 2025-07-22 RX ORDER — MELOXICAM 7.5 MG/1
TABLET ORAL
Qty: 90 TABLET | Refills: 2 | Status: SHIPPED | OUTPATIENT
Start: 2025-07-22

## 2025-07-22 NOTE — TELEPHONE ENCOUNTER
Jacqueline requesting refill on Xarelto.  Dr. Earl referred her back to Dr. Simpson.  To [facility] locally    Thanks

## 2025-08-08 ENCOUNTER — TELEPHONE (OUTPATIENT)
Dept: PRIMARY CARE | Facility: CLINIC | Age: 85
End: 2025-08-08
Payer: MEDICARE

## 2025-08-08 DIAGNOSIS — I48.92 ATRIAL FLUTTER, UNSPECIFIED TYPE (MULTI): Primary | ICD-10-CM

## 2025-08-08 DIAGNOSIS — R73.9 HYPERGLYCEMIA: ICD-10-CM

## 2025-08-08 NOTE — TELEPHONE ENCOUNTER
Pt has an appt on 9/11/25 and is looking for orders for blood work. Please call pt and advise when they are in the system

## 2025-08-19 DIAGNOSIS — I10 ESSENTIAL (PRIMARY) HYPERTENSION: ICD-10-CM

## 2025-08-20 RX ORDER — AMLODIPINE BESYLATE 5 MG/1
5 TABLET ORAL DAILY
Qty: 90 TABLET | Refills: 3 | Status: SHIPPED | OUTPATIENT
Start: 2025-08-20

## 2025-09-04 LAB
ALBUMIN SERPL-MCNC: 4.3 G/DL (ref 3.6–5.1)
ALP SERPL-CCNC: 48 U/L (ref 37–153)
ALT SERPL-CCNC: 14 U/L (ref 6–29)
ANION GAP SERPL CALCULATED.4IONS-SCNC: 8 MMOL/L (CALC) (ref 7–17)
AST SERPL-CCNC: 15 U/L (ref 10–35)
BILIRUB SERPL-MCNC: 0.5 MG/DL (ref 0.2–1.2)
BUN SERPL-MCNC: 22 MG/DL (ref 7–25)
CALCIUM SERPL-MCNC: 9.7 MG/DL (ref 8.6–10.4)
CHLORIDE SERPL-SCNC: 107 MMOL/L (ref 98–110)
CO2 SERPL-SCNC: 29 MMOL/L (ref 20–32)
CREAT SERPL-MCNC: 1.01 MG/DL (ref 0.6–0.95)
EGFRCR SERPLBLD CKD-EPI 2021: 55 ML/MIN/1.73M2
ERYTHROCYTE [DISTWIDTH] IN BLOOD BY AUTOMATED COUNT: 13.4 % (ref 11–15)
EST. AVERAGE GLUCOSE BLD GHB EST-MCNC: 114 MG/DL
EST. AVERAGE GLUCOSE BLD GHB EST-SCNC: 6.3 MMOL/L
GLUCOSE SERPL-MCNC: 102 MG/DL (ref 65–99)
HBA1C MFR BLD: 5.6 %
HCT VFR BLD AUTO: 37.3 % (ref 35–45)
HGB BLD-MCNC: 11.8 G/DL (ref 11.7–15.5)
MCH RBC QN AUTO: 33.1 PG (ref 27–33)
MCHC RBC AUTO-ENTMCNC: 31.6 G/DL (ref 32–36)
MCV RBC AUTO: 104.5 FL (ref 80–100)
PLATELET # BLD AUTO: 187 THOUSAND/UL (ref 140–400)
PMV BLD REES-ECKER: 11.3 FL (ref 7.5–12.5)
POTASSIUM SERPL-SCNC: 4.4 MMOL/L (ref 3.5–5.3)
PROT SERPL-MCNC: 6.9 G/DL (ref 6.1–8.1)
RBC # BLD AUTO: 3.57 MILLION/UL (ref 3.8–5.1)
SODIUM SERPL-SCNC: 144 MMOL/L (ref 135–146)
WBC # BLD AUTO: 4.5 THOUSAND/UL (ref 3.8–10.8)

## 2025-09-11 ENCOUNTER — APPOINTMENT (OUTPATIENT)
Dept: PRIMARY CARE | Facility: CLINIC | Age: 85
End: 2025-09-11
Payer: MEDICARE

## 2025-12-04 ENCOUNTER — APPOINTMENT (OUTPATIENT)
Dept: CARDIOLOGY | Facility: CLINIC | Age: 85
End: 2025-12-04
Payer: MEDICARE